# Patient Record
Sex: FEMALE | Race: WHITE | NOT HISPANIC OR LATINO | Employment: PART TIME | URBAN - METROPOLITAN AREA
[De-identification: names, ages, dates, MRNs, and addresses within clinical notes are randomized per-mention and may not be internally consistent; named-entity substitution may affect disease eponyms.]

---

## 2017-07-06 ENCOUNTER — HOSPITAL ENCOUNTER (OUTPATIENT)
Facility: CLINIC | Age: 32
End: 2017-07-06
Attending: ORTHOPAEDIC SURGERY | Admitting: ORTHOPAEDIC SURGERY

## 2017-08-13 ENCOUNTER — HOSPITAL ENCOUNTER (INPATIENT)
Facility: HOSPITAL | Age: 32
LOS: 2 days | Discharge: HOME | DRG: 189 | End: 2017-08-16
Attending: EMERGENCY MEDICINE | Admitting: INTERNAL MEDICINE
Payer: MEDICARE

## 2017-08-13 DIAGNOSIS — J20.9 ACUTE BRONCHITIS, UNSPECIFIED ORGANISM: ICD-10-CM

## 2017-08-13 DIAGNOSIS — J45.901 ASTHMA EXACERBATION: ICD-10-CM

## 2017-08-13 DIAGNOSIS — G47.30 SLEEP APNEA, UNSPECIFIED TYPE: ICD-10-CM

## 2017-08-13 DIAGNOSIS — J45.20 MILD INTERMITTENT ASTHMA IN ADULT WITHOUT COMPLICATION: ICD-10-CM

## 2017-08-13 DIAGNOSIS — R10.84 ABDOMINAL PAIN, GENERALIZED: ICD-10-CM

## 2017-08-13 DIAGNOSIS — J84.9 IDIOPATHIC INTERSTITIAL PULMONARY DISEASE (H): Primary | ICD-10-CM

## 2017-08-13 LAB
ALBUMIN SERPL-MCNC: 3.5 G/DL (ref 3.4–5)
ALBUMIN UR-MCNC: 10 MG/DL
ALP SERPL-CCNC: 60 U/L (ref 40–150)
ALT SERPL W P-5'-P-CCNC: 23 U/L (ref 0–50)
AMYLASE SERPL-CCNC: 23 U/L (ref 30–110)
ANION GAP SERPL CALCULATED.3IONS-SCNC: 8 MMOL/L (ref 3–14)
APPEARANCE UR: CLEAR
AST SERPL W P-5'-P-CCNC: 15 U/L (ref 0–45)
BACTERIA #/AREA URNS HPF: ABNORMAL /HPF
BASOPHILS # BLD AUTO: 0.1 10E9/L (ref 0–0.2)
BASOPHILS NFR BLD AUTO: 0.4 %
BILIRUB SERPL-MCNC: 0.3 MG/DL (ref 0.2–1.3)
BILIRUB UR QL STRIP: NEGATIVE
BUN SERPL-MCNC: 10 MG/DL (ref 7–30)
CALCIUM SERPL-MCNC: 8.8 MG/DL (ref 8.5–10.1)
CHLORIDE SERPL-SCNC: 107 MMOL/L (ref 94–109)
CO2 SERPL-SCNC: 24 MMOL/L (ref 20–32)
COLOR UR AUTO: YELLOW
CREAT SERPL-MCNC: 0.68 MG/DL (ref 0.52–1.04)
D DIMER PPP DDU-MCNC: 236 NG/ML D-DU (ref 0–300)
DIFFERENTIAL METHOD BLD: ABNORMAL
EOSINOPHIL # BLD AUTO: 0.4 10E9/L (ref 0–0.7)
EOSINOPHIL NFR BLD AUTO: 2.4 %
ERYTHROCYTE [DISTWIDTH] IN BLOOD BY AUTOMATED COUNT: 13.2 % (ref 10–15)
GFR SERPL CREATININE-BSD FRML MDRD: ABNORMAL ML/MIN/1.7M2
GLUCOSE SERPL-MCNC: 128 MG/DL (ref 70–99)
GLUCOSE UR STRIP-MCNC: NEGATIVE MG/DL
HCG UR QL: NEGATIVE
HCT VFR BLD AUTO: 39 % (ref 35–47)
HGB BLD-MCNC: 13.2 G/DL (ref 11.7–15.7)
HGB UR QL STRIP: NEGATIVE
IMM GRANULOCYTES # BLD: 0.1 10E9/L (ref 0–0.4)
IMM GRANULOCYTES NFR BLD: 0.4 %
KETONES UR STRIP-MCNC: NEGATIVE MG/DL
LEUKOCYTE ESTERASE UR QL STRIP: NEGATIVE
LIPASE SERPL-CCNC: 83 U/L (ref 73–393)
LYMPHOCYTES # BLD AUTO: 2.7 10E9/L (ref 0.8–5.3)
LYMPHOCYTES NFR BLD AUTO: 15.2 %
MCH RBC QN AUTO: 29.4 PG (ref 26.5–33)
MCHC RBC AUTO-ENTMCNC: 33.8 G/DL (ref 31.5–36.5)
MCV RBC AUTO: 87 FL (ref 78–100)
MONOCYTES # BLD AUTO: 1.4 10E9/L (ref 0–1.3)
MONOCYTES NFR BLD AUTO: 7.9 %
MUCOUS THREADS #/AREA URNS LPF: PRESENT /LPF
NEUTROPHILS # BLD AUTO: 13 10E9/L (ref 1.6–8.3)
NEUTROPHILS NFR BLD AUTO: 73.7 %
NITRATE UR QL: NEGATIVE
NRBC # BLD AUTO: 0 10*3/UL
NRBC BLD AUTO-RTO: 0 /100
PH UR STRIP: 5.5 PH (ref 4.7–8)
PLATELET # BLD AUTO: 335 10E9/L (ref 150–450)
POTASSIUM SERPL-SCNC: 3.6 MMOL/L (ref 3.4–5.3)
PROT SERPL-MCNC: 8.2 G/DL (ref 6.8–8.8)
RBC # BLD AUTO: 4.49 10E12/L (ref 3.8–5.2)
RBC #/AREA URNS AUTO: 2 /HPF (ref 0–2)
SODIUM SERPL-SCNC: 139 MMOL/L (ref 133–144)
SP GR UR STRIP: 1.02 (ref 1–1.03)
SQUAMOUS #/AREA URNS AUTO: 3 /HPF (ref 0–1)
URN SPEC COLLECT METH UR: ABNORMAL
UROBILINOGEN UR STRIP-MCNC: NORMAL MG/DL (ref 0–2)
WBC # BLD AUTO: 17.6 10E9/L (ref 4–11)
WBC #/AREA URNS AUTO: 1 /HPF (ref 0–2)

## 2017-08-13 PROCEDURE — 85379 FIBRIN DEGRADATION QUANT: CPT | Performed by: EMERGENCY MEDICINE

## 2017-08-13 PROCEDURE — 81001 URINALYSIS AUTO W/SCOPE: CPT | Performed by: EMERGENCY MEDICINE

## 2017-08-13 PROCEDURE — 71020 ZZHC CHEST TWO VIEWS, FRONT/LAT: CPT | Mod: TC

## 2017-08-13 PROCEDURE — 25000128 H RX IP 250 OP 636: Performed by: EMERGENCY MEDICINE

## 2017-08-13 PROCEDURE — 25000128 H RX IP 250 OP 636

## 2017-08-13 PROCEDURE — 80053 COMPREHEN METABOLIC PANEL: CPT | Performed by: EMERGENCY MEDICINE

## 2017-08-13 PROCEDURE — 99285 EMERGENCY DEPT VISIT HI MDM: CPT | Mod: 25

## 2017-08-13 PROCEDURE — 99285 EMERGENCY DEPT VISIT HI MDM: CPT | Performed by: EMERGENCY MEDICINE

## 2017-08-13 PROCEDURE — 81025 URINE PREGNANCY TEST: CPT | Performed by: EMERGENCY MEDICINE

## 2017-08-13 PROCEDURE — 83690 ASSAY OF LIPASE: CPT | Performed by: EMERGENCY MEDICINE

## 2017-08-13 PROCEDURE — 82150 ASSAY OF AMYLASE: CPT | Performed by: EMERGENCY MEDICINE

## 2017-08-13 PROCEDURE — 96375 TX/PRO/DX INJ NEW DRUG ADDON: CPT

## 2017-08-13 PROCEDURE — 96374 THER/PROPH/DIAG INJ IV PUSH: CPT

## 2017-08-13 PROCEDURE — 85025 COMPLETE CBC W/AUTO DIFF WBC: CPT | Performed by: EMERGENCY MEDICINE

## 2017-08-13 RX ORDER — KETOROLAC TROMETHAMINE 30 MG/ML
30 INJECTION, SOLUTION INTRAMUSCULAR; INTRAVENOUS ONCE
Status: COMPLETED | OUTPATIENT
Start: 2017-08-13 | End: 2017-08-13

## 2017-08-13 RX ORDER — MORPHINE SULFATE 4 MG/ML
INJECTION, SOLUTION INTRAMUSCULAR; INTRAVENOUS
Status: COMPLETED
Start: 2017-08-13 | End: 2017-08-13

## 2017-08-13 RX ORDER — MORPHINE SULFATE 4 MG/ML
4 INJECTION, SOLUTION INTRAMUSCULAR; INTRAVENOUS ONCE
Status: COMPLETED | OUTPATIENT
Start: 2017-08-13 | End: 2017-08-13

## 2017-08-13 RX ADMIN — MORPHINE SULFATE 4 MG: 4 INJECTION, SOLUTION INTRAMUSCULAR; INTRAVENOUS at 23:41

## 2017-08-13 RX ADMIN — KETOROLAC TROMETHAMINE 30 MG: 30 INJECTION, SOLUTION INTRAMUSCULAR; INTRAVENOUS at 23:40

## 2017-08-13 ASSESSMENT — ENCOUNTER SYMPTOMS
CARDIOVASCULAR NEGATIVE: 1
ENDOCRINE NEGATIVE: 1
RESPIRATORY NEGATIVE: 1
NEUROLOGICAL NEGATIVE: 1
EYES NEGATIVE: 1
SHORTNESS OF BREATH: 0
FEVER: 0
HEMATOLOGIC/LYMPHATIC NEGATIVE: 1
MUSCULOSKELETAL NEGATIVE: 1
ALLERGIC/IMMUNOLOGIC NEGATIVE: 1
CHILLS: 0
CONSTITUTIONAL NEGATIVE: 1
PSYCHIATRIC NEGATIVE: 1

## 2017-08-13 NOTE — IP AVS SNAPSHOT
MRN:[id number]                      After Visit Summary   8/13/2017    Cassandra Camejo    MRN: [id number]           Thank you!     Thank you for choosing [facility] for your care. Our goal is always to provide you with excellent care. Hearing back from our patients is one way we can continue to improve our services. Please take a few minutes to complete the written survey that you may receive in the mail after you visit with us. Thank you!        Patient Information     Date Of Birth          1985        Designated Caregiver       Most Recent Value    Caregiver    Will someone help with your care after discharge? yes    Name of designated caregiver Khang :     Phone number of caregiver see facesheet     Caregiver address [place], [place]       About your hospital stay     You were admitted on:  August 14, 2017 You last received care in the:  14 Intensive Care Unit    You were discharged on:  August 16, 2017        Reason for your hospital stay       Cassandra Camejo is a 32 year old woman who was admitted on 8/13/2017. She has a history of asthma and tobacco abuse, noting tright flank pain along with recent symptoms typical for her of asthma exacerbation. Prominent hypoxemia, resolved over several days.   Clinical impression was of a viral pneumonitis or possible noninfectious inflammatory lung disorder.  Other evaluation raised concerns for sleep disordered breathing.  Pulmonary function testing arranged as outpatient as well as sleep study. relatively prolonged taper of systemic steroids prescribed at discharge given possible inflammatory lung disorder.                  Who to Call     For medical emergencies, please call 911.  For non-urgent questions about your medical care, please call your primary care provider or clinic, [phone]          Attending Provider     Provider Specialty    Juice Flores MD Emergency Medicine    Marky Vogel,  Internal Medicine    Pancho  Tera GUTIERREZ MD Internal Medicine       Primary Care Provider Office Phone # Fax #    Varinder Burk [phone]      After Care Instructions     Activity       Your activity upon discharge: activity as tolerated            Diet       Follow this diet upon discharge: Orders Placed This Encounter      Regular Diet Adult                  Follow-up Appointments     Follow-up and recommended labs and tests        Follow up with primary care provider, Varinder Burk, within 7-14 days for hospital follow- up.   Pulmonary function testing and sleep study arranged at the time of discharge  Smoking cessation strongly recommended  PA/lat CXR in 4-6 weeks                  Your next 10 appointments already scheduled     Oct 19, 2017   Procedure with Chito Avalos MD   [facility] (--)    [address]  [facility] [address] [phone]              Additional Services     SLEEP EVALUATION & MANAGEMENT REFERRAL - ADULT       Please be aware that coverage of these services is subject to the terms and limitations of your health insurance plan.  Call member services at your health plan with any benefit or coverage questions.      Please bring the following to your appointment:    >>   List of current medications   >>   This referral request   >>   Any documents/labs given to you for this referral    Other:  [facility]                  Future tests that were ordered for you     General PFT Lab (Please always keep checked)           Pulmonary Function Test       ** Bronchodilators/neb treatments should be held 4-6 hours prior to receiving a bronchodilator study.    [facility] will distribute Patient Information Packet;     [facility] ([facility]) will contact patient by telephone    Please call the following departments if there are questions or need assistance:  [facility]: [phone]  [facility]:  [phone]  [facility]: [phone]  [facility] Reg[facility]: [phone]  [facility]: [phone]  [facility]: [phone]                        Further instructions from your care team       Sleep lab has been notified that you need an appointment set up for sleep study and PFT. They will call you to schedule an appointment that fits your schedule.    Also follow up with Dr. Burk as scheduled on September 21st at 9:00 AM, unless need arises to see him earlier.     Pending Results     Date and Time Order Name Status Description    8/14/2017 1001 Blood culture Preliminary     8/14/2017 1001 Blood culture Preliminary     8/14/2017 0702 Sputum Culture Aerobic Bacterial Preliminary             Statement of Approval     Ordered          08/16/17 1005  I have reviewed and agree with all the recommendations and orders detailed in this document.  EFFECTIVE NOW     Approved and electronically signed by:  Tera Deleon MD             Admission Information     Date & Time Provider Department Dept. Phone    8/13/2017 Tera Deleon MD 14 Intensive Care Unit [phone]      Your Vitals Were     Blood Pressure Pulse Temperature Respirations Height Weight    141/94 84 98.2  F (36.8  C) (Tympanic) 18 1.651 m (5' 5\") 117.9 kg (259 lb 14.8 oz)    Pulse Oximetry BMI (Body Mass Index)                95% 43.25 kg/m2          MyChart Information     [employer] lets you send messages to your doctor, view your test results, renew your prescriptions, schedule appointments and more. To sign up, go to [email or web address] . Click on \"Log in\" on the left side of the screen, which will take you to the Welcome page. Then click on \"Sign up Now\" on the right side of the page.     You will be asked to enter the access code listed below, as well as some personal information. Please follow the directions to create your username and password.     Your access code is: " [email or web address]  Expires: 2017 10:07 AM     Your access code will  in 90 days. If you need help or a new code, please call your [facility] clinic or [phone].        Care EveryWhere ID     This is your Care EveryWhere ID. This could be used by other organizations to access your [facility] medical records  [id number]        Equal Access to Services     [facility]: Hadii aad ku hadasho Soomaali, waaxda luqadaha, qaybta kaalmada adeegyada, waxay idiin hayaan adeeg patricia laRandykamla . So [facility] [phone].    ATENCIÓN: Si habla español, tiene a begum disposición servicios gratuitos de asistencia lingüística. Dre al [phone].    We comply with applicable federal civil rights laws and [place] laws. We do not discriminate on the basis of race, color, national origin, age, disability sex, sexual orientation or gender identity.               Review of your medicines      START taking        Dose / Directions    azithromycin 250 MG tablet   Commonly known as:  ZITHROMAX Z-VANDA        Two tablets on the first day, then one tablet daily for the next 4 days   Quantity:  6 tablet   Refills:  0       predniSONE 10 MG tablet   Commonly known as:  DELTASONE   Used for:  Idiopathic interstitial pulmonary disease (H)        4 tabs PO daily for 7 days, then 3 tabs PO daily for 7 days, then 2 tabs PO daily for 7 days then 1 tab PO daily for 7 days, then stop   Quantity:  70 tablet   Refills:  0         CONTINUE these medicines which have NOT CHANGED        Dose / Directions    albuterol 108 (90 BASE) MCG/ACT Inhaler   Commonly known as:  PROAIR HFA/PROVENTIL HFA/VENTOLIN HFA        Dose:  2 puff   Inhale 2 puffs into the lungs every 6 hours   Refills:  0       benzonatate 100 MG capsule   Commonly known as:  TESSALON   Used for:  Asthma with acute exacerbation, unspecified asthma severity        Dose:  100 mg   Take 1 capsule (100 mg) by mouth 3 times daily as needed for cough   Quantity:  30 capsule   Refills:  0        guaiFENesin-dextromethorphan 100-10 MG/5ML syrup   Commonly known as:  ROBITUSSIN DM   Used for:  Asthma with acute exacerbation, unspecified asthma severity        Dose:  10 mL   Take 10 mLs by mouth every 4 hours as needed for cough   Quantity:  560 mL   Refills:  0       HYDROcodone-acetaminophen 5-325 MG per tablet   Commonly known as:  NORCO        Dose:  1 tablet   Take 1 tablet by mouth 4 times daily as needed for moderate to severe pain   Refills:  0       IBUPROFEN PO        Dose:  400 mg   Take 400 mg by mouth every 6 hours as needed for moderate pain   Refills:  0       ipratropium - albuterol 0.5 mg/2.5 mg/3 mL 0.5-2.5 (3) MG/3ML neb solution   Commonly known as:  DUONEB   Used for:  Asthma with acute exacerbation, unspecified asthma severity        Dose:  3 mL   Take 1 vial (3 mLs) by nebulization every 4 hours as needed for wheezing   Quantity:  360 mL   Refills:  0       nicotine polacrilex 2 MG gum   Commonly known as:  RA NICOTINE   Used for:  Tobacco dependence syndrome        Dose:  2 mg   Place 1 each (2 mg) inside cheek as needed for smoking cessation   Quantity:  30 tablet   Refills:  0       order for DME   Used for:  Asthma with acute exacerbation, unspecified asthma severity        Equipment being ordered: Other: nebulization machine Treatment Diagnosis: acute asthma exacerbation   Quantity:  1 Device   Refills:  0            Where to get your medicines      These medications were sent to [facility] [address] [address]     Phone:  [phone]     predniSONE 10 MG tablet         Some of these will need a paper prescription and others can be bought over the counter. Ask your nurse if you have questions.     Bring a paper prescription for each of these medications     azithromycin 250 MG tablet                Protect others around you: Learn how to safely use, store and throw away your medicines at www.disposemymeds.org.              Medication List: This is a list of all your medications and when to take them. Check marks below indicate your daily home schedule. Keep this list as a reference.      Medications           Morning Afternoon Evening Bedtime As Needed    albuterol 108 (90 BASE) MCG/ACT Inhaler   Commonly known as:  PROAIR HFA/PROVENTIL HFA/VENTOLIN HFA   Inhale 2 puffs into the lungs every 6 hours   Last time this was given:  2 puffs on 8/15/2017  7:25 PM                                azithromycin 250 MG tablet   Commonly known as:  ZITHROMAX Z-VANDA   Two tablets on the first day, then one tablet daily for the next 4 days   Last time this was given:  500 mg on 8/14/2017  2:50 AM                                benzonatate 100 MG capsule   Commonly known as:  TESSALON   Take 1 capsule (100 mg) by mouth 3 times daily as needed for cough   Last time this was given:  100 mg on 8/16/2017  4:20 AM                                guaiFENesin-dextromethorphan 100-10 MG/5ML syrup   Commonly known as:  ROBITUSSIN DM   Take 10 mLs by mouth every 4 hours as needed for cough   Last time this was given:  5 mLs on 8/16/2017  8:50 AM                                HYDROcodone-acetaminophen 5-325 MG per tablet   Commonly known as:  NORCO   Take 1 tablet by mouth 4 times daily as needed for moderate to severe pain   Last time this was given:  1 tablet on 8/16/2017  8:50 AM                                IBUPROFEN PO   Take 400 mg by mouth every 6 hours as needed for moderate pain   Last time this was given:  400 mg on 8/16/2017  4:20 AM                                ipratropium - albuterol 0.5 mg/2.5 mg/3 mL 0.5-2.5 (3) MG/3ML neb solution   Commonly known as:  DUONEB   Take 1 vial (3 mLs) by nebulization every 4 hours as needed for wheezing   Last time this was given:  3 mLs on 8/14/2017  4:45 PM                                nicotine polacrilex 2 MG gum   Commonly known as:  RA NICOTINE   Place 1 each (2 mg) inside cheek as needed for smoking  cessation                                order for DME   Equipment being ordered: Other: nebulization machine Treatment Diagnosis: acute asthma exacerbation                                predniSONE 10 MG tablet   Commonly known as:  DELTASONE   4 tabs PO daily for 7 days, then 3 tabs PO daily for 7 days, then 2 tabs PO daily for 7 days then 1 tab PO daily for 7 days, then stop   Last time this was given:  40 mg on 8/16/2017  8:50 AM                                          More Information        Heparin injection  What is this medicine?  HEPARIN (HEP a rin) is an anticoagulant. It is used to treat or prevent clots in the veins, arteries, lungs, or heart. It stops clots from forming or getting bigger. This medicine prevents clotting during open-heart surgery, dialysis, or in patients who are confined to bed.  How should I use this medicine?  This medicine is given by injection or infusion into a vein. It can also be given by injection of small amounts under the skin. It is usually given by a health care professional in a hospital or clinic setting.  If you get this medicine at home, you will be taught how to prepare and give this medicine. Use exactly as directed. Take your medicine at regular intervals. Do not take it more often than directed. Do not stop taking except on your doctor's advice. Stopping this medicine may increase your risk of a blot clot. Be sure to refill your prescription before you run out of medicine.  It is important that you put your used needles and syringes in a special sharps container. Do not put them in a trash can. If you do not have a sharps container, call your pharmacist or healthcare provider to get one.  Talk to your pediatrician regarding the use of this medicine in children. While this medicine may be prescribed for children for selected conditions, precautions do apply.  What side effects may I notice from receiving this medicine?  Side effects that you should report to your  doctor or health care professional as soon as possible:    allergic reactions like skin rash, itching or hives, swelling of the face, lips, or tongue    back pain    burning or itching on the bottoms of the feet    cold, blue, or painful hands and feet    feeling faint or lightheaded, falls    fever, chills    nausea, vomiting    signs and symptoms of bleeding such as bloody or black, tarry stools; red or dark-brown urine; spitting up blood or brown material that looks like coffee grounds; red spots on the skin; unusual bruising or bleeding from the eye, gums, or nose    stomach pain    unusually low blood pressure  Side effects that usually do not require medical attention (report to your doctor or health care professional if they continue or are bothersome):    pain, redness, or irritation at site where injected  What may interact with this medicine?  Do not take this medicine with any of the following medications:    aspirin and aspirin-like drugs    mifepristone    medicines that treat or prevent blood clots like warfarin, enoxaparin, and dalteparin    palifermin    protamine  This medicine may also interact with the following medications:    dextran    digoxin    hydroxychloroquine    medicines for treating colds or allergies    nicotine    NSAIDs, medicines for pain and inflammation, like ibuprofen or naproxen    phenylbutazone    tetracycline antibiotics  What if I miss a dose?  If you miss a dose, take it as soon as you can. If it is almost time for your next dose, take only that dose. Do not take double or extra doses.  Where should I keep my medicine?  Keep out of the reach of children.  Store unopened vials at room temperature between 15 and 30 degrees C (59 and 86 degrees F). Do not freeze. Do not use if solution is discolored or particulate matter is present. Throw away any unused medicine after the expiration date.  What should I tell my health care provider before I take this medicine?  They need to  know if you have any of these conditions:    bleeding disorders, such as hemophilia or low blood platelets    bowel disease or diverticulitis    endocarditis    high blood pressure    liver disease    recent surgery or delivery of a baby    stomach ulcers    an unusual or allergic reaction to heparin, benzyl alcohol, sulfites, other medicines, foods, dyes, or preservatives    pregnant or trying to get pregnant    breast-feeding  What should I watch for while using this medicine?  While you are taking this medicine, carry an identification card with your name, the name and dose of medicine(s) being used, and the name and phone number of your doctor or health care professional or person to contact in an emergency.  Notify your doctor or health care professional and seek emergency treatment if you develop breathing problems; changes in vision; chest pain; severe, sudden headache; pain, swelling, warmth in the leg; trouble speaking; sudden numbness or weakness of the face, arm, or leg. These can be signs that your condition has gotten worse.  Notify your doctor or health care professional at once if you have cold, blue hands or feet.  If you are going to have surgery or dental work, tell your doctor or health care professional that you have received this medicine. Be careful brushing and flossing your teeth or using a toothpick while receiving this medicine because you may bleed more easily.  Avoid sports and activities that might cause injury while you are using this medicine. Severe falls or injuries can cause unseen bleeding. Be careful when using sharp tools or knives. Consider using an electric razor.  NOTE:This sheet is a summary. It may not cover all possible information. If you have questions about this medicine, talk to your doctor, pharmacist, or health care provider. Copyright  2017 [employer]                Pneumonia (Adult)  Pneumonia is an infection deep within the lungs. It is in the small air sacs  (alveoli). Pneumonia may be caused by a virus or bacteria. Pneumonia caused by bacteria is usually treated with an antibiotic. Severe cases may need to be treated in the hospital. Milder cases can be treated at home. Symptoms usually start to get better during the first 2 days of treatment.    Home care  Follow these guidelines when caring for yourself at home:    Rest at home for the first 2 to 3 days, or until you feel stronger. Don t let yourself get overly tired when you go back to your activities.    Stay away from cigarette smoke - yours or other people s.    You may use acetaminophen or ibuprofen to control fever or pain, unless another medicine was prescribed. If you have chronic liver or kidney disease, talk with your healthcare provider before using these medicines. Also talk with your provider if you ve had a stomach ulcer or gastrointestinal bleeding. Don t give aspirin to anyone younger than 18 years of age who is ill with a fever. It may cause severe liver damage.    Your appetite may be poor, so a light diet is fine.    Drink 6 to 8 glasses of fluids every day to make sure you are getting enough fluids. Beverages can include water, sport drinks, sodas without caffeine, juices, tea, or soup. Fluids will help loosen secretions in the lung. This will make it easier for you to cough up the phlegm (sputum). If you also have heart or kidney disease, check with your healthcare provider before you drink extra fluids.    Take antibiotic medicine prescribed until it is all gone, even if you are feeling better after a few days.  Follow-up care  Follow up with your healthcare provider in the next 2 to 3 days, or as advised. This is to be sure the medicine is helping you get better.  If you are 65 or older, you should get a pneumococcal vaccine and a yearly flu (influenza) shot. You should also get these vaccines if you have chronic lung disease like asthma, emphysema, or COPD. Recently, a second type of pneumonia  vaccine has become available for everyone over 65 years old. This is in addition to the previous vaccine. Ask your provider about this.  When to seek medical advice  Call your healthcare provider right away if any of these occur:    You don t get better within the first 48 hours of treatment    Shortness of breath gets worse    Rapid breathing (more than 25 breaths per minute)    Coughing up blood    Chest pain gets worse with breathing    Fever of 100.4 F (38 C) or higher that doesn t get better with fever medicine    Weakness, dizziness, or fainting that gets worse    Thirst or dry mouth that gets worse    Sinus pain, headache, or a stiff neck    Chest pain not caused by coughing  Date Last Reviewed: 1/1/2017 2000-2017 [employer]. [address] [place] [address], [place] [address]. All rights reserved. This information is not intended as a substitute for professional medical care. Always follow your healthcare professional's instructions.                Treating Pneumonia  Pneumonia is an infection of one or both of the lungs. Pneumonia:    Is usually caused by either a virus or a bacteria    Can be very serious, especially in infants, young children, and older adults. It s also serious for those with other long-term health problems or weakened immune systems.    Is sometimes treated at home and sometimes in the hospital    Antibiotic medicines  Antibiotics may be prescribed for pneumonia caused by bacteria. They may be pills (oral medicines), or shots (injections). Or they may be given by IV (intravenously) into a vein. If you are taking oral medicines at home:    Fill your prescription and start taking your medicine as soon as you can.    You will likely start to feel better in a day or 2, but don t stop taking the antibiotic.    Use a pill organizer to help you remember to take your medicine.    Let your healthcare provider know if you have side effects.    Take your medicine exactly as directed on the  label. Talk to your provider or pharmacist if you have any questions.  Antiviral medicines  Antiviral medicine may be prescribed for pneumonia caused by a virus. For example, antiviral medicine may be prescribed for pneumonia caused by the flu virus. Antibiotics do not work against viruses. If you are taking antiviral medicine at home:    Fill your prescription and start taking your medicine as soon as you can.    Talk with your provider or pharmacist about possible side effects.    Take the medicine exactly as instructed.  To relieve symptoms  There are many medicines that can help relieve symptoms of pneumonia. Some are prescription and some are over-the-counter.  Your healthcare provider may recommend:    Acetaminophen or ibuprofen to lower your fever and to lessen headache or other pain    Cough medicine to loosen mucus or to reduce coughing  Make sure you check with your healthcare provider or pharmacist before taking any over-the-counter medicines.  Special treatments  If you are hospitalized for pneumonia, you may have other therapies, including:    Inhaled medicines to help with breathing or chest congestion    Supplemental oxygen to increase low oxygen levels  Drink fluids and eat healthy  You should eat healthy to help your body fight the infection. Drinking a lot of fluids helps to replace fluids lost from fever and to loosen mucus in your chest.    Diet. Make healthy food choices, including fruits and vegetables, lean meats and other proteins, 100% whole grain and low- or no-fat dairy products.    Fluids. Drink at least 6 to 8 tall glasses a day. Water and 100% fruit or vegetable juice are best.  Get plenty of rest and sleep  You may be more tired than usual for a while. It is important to get enough sleep at night. It s also important to rest during the day. Talk with your healthcare provider if coughing or other symptoms are interfering with your sleep.  Preventing the spread of germs  The best thing  you can do to prevent spreading germs is to wash your hands often. You should:    Rub your hand with soap and water for 20 to 30 seconds.    Clean in between your fingers, the backs of your hands, and around your nails.    Dry your hands on a separate towel or use paper towels.  You should also:    Keep alcohol-based hand  nearby.    Make sure you also clean surfaces that you touch. Use a product that kills all types of germs.    Stay away from others until you are feeling better.  When to call your healthcare provider  Call your healthcare provider if you have any of the following:    Symptoms get worse    Fever continues    Shortness of breath gets worse    Increased mucus or mucus that is darker in color    Coughing gets worse    Lips or fingers are bluish in color    Side effects from your medicine   Date Last Reviewed: 12/1/2016 2000-2017 [employer]. [address] [place] [address]. All rights reserved. This information is not intended as a substitute for professional medical care. Always follow your healthcare professional's instructions.                What is Pneumonia?  Pneumonia is a serious lung infection. Many cases of pneumonia are caused by bacteria or viruses. Fungi may also cause pneumonia, but this is less common.  You may also get pneumonia after another illness, such as a cold, flu, or bronchitis. Those most at risk include older adults, smokers, and people with long-term (chronic) health problems or weak immune systems.  Healthy lungs    Air travels in and out of the lungs through tubes called airways.    The tubes branch into smaller passages called bronchioles. These end in tiny sacs called alveoli.    Blood vessels surrounding the alveoli take oxygen into the bloodstream. At the same time, the alveoli remove carbon dioxide (a waste gas) from the blood. The carbon dioxide is then exhaled.  When you have pneumonia      Pneumonia causes the bronchioles and the  alveoli to fill with excess mucus and become inflamed.    Your body s response may be to cough. This can help clear out the fluid.    The fluid (or mucus) you cough up may appear green or dark yellow.    The excess mucus may make you feel short of breath.    The inflammation and infection may give you a fever.  What are the symptoms?  Symptoms of pneumonia can come without warning. At first, you may think you have a cold or flu. But symptoms may get worse quickly, turning into pneumonia. Symptoms can be different for bacterial and viral pneumonia. Common symptoms may include the following:    Severe cough with green or yellow mucus that doesn't improve or that gets worse    Fever and chills    Nausea, vomiting or diarrhea    Shortness of breath with normal daily activities    Increased heart rate    Chest pain or discomfort when breathing in or coughing    Headache    Excessive sweating and clammy skin  Date Last Reviewed: 12/1/2016 2000-2017 [employer]. [address] [place] [address]. All rights reserved. This information is not intended as a substitute for professional medical care. Always follow your healthcare professional's instructions.                When You Have Pneumonia  You have been diagnosed with pneumonia. This is a serious lung infection. Most cases of pneumonia are caused by bacteria. Pneumonia most often occurs in older adults, young children, and people with chronic health problems.  Home care    Take your medicine exactly as directed. Don t skip doses. Continue taking your antibiotics as until they are all gone, even if you start to feel better. This will prevent the pneumonia from coming back.    Drink at least 8 glasses of water daily, unless directed otherwise. This helps to loosen and thin secretions so that you can cough them up.    Use a cool-mist humidifier in your bedroom. Be sure to clean the humidifier daily.    Don t use medicines to suppress your cough  unless your cough is dry, painful, or interferes with your sleep. Coughing up mucus is normal. You may use an expectorant if your healthcare provider says it s okay.    You can use warm compresses or a heating pad on the lowest setting to relieve chest discomfort. Use several times a day for 15-20 minutes at a time. To prevent injury to your skin, set the temperature to warm, not hot. Don t put the compress or pad directly on your skin. Make certain it has a cover or wrap it in a towel. This is to prevent skin burns.    Get plenty of rest until your fever, shortness of breath, and chest pain go away.    Plan to get a flu shot every year. The flu is a common cause of pneumonia. Getting a flu shot every year can help prevent both the flu and pneumonia.  Getting the pneumococcal vaccine  Talk with your healthcare provider about getting the pneumococcalvaccine. Pneumococcal pneumonia is caused by bacteria that spread from person to person. It can cause minor problems, such as ear infections. But it can also turn into life-threatening illnesses of the lungs (pneumonia), the covering of the brain and spinal cord (meningitis), and the blood (bacteremia).  Children under 2 years of age, adults over age 65, people with certain health conditions, and smokers are at the highest risk of pneumococcal disease. This vaccine can help prevent pneumococcal disease in both adults and children. Some people should not have the vaccine. Make sure to ask your healthcare provider if you should have the vaccine.   Follow-up care  Make a follow-up appointment as directed by our staff.  When to call your healthcare provider  Call your healthcare provider if you have any of the following:    Fever above 100.4 F (38 C), or as directed by your healthcare provider    Mucus from the lungs (sputum) that s yellow, green, bloody, or smells bad    A large amount of sputum    Vomiting    Symptoms that get worse  When to call 911  Call 911 right away if  you have any of the following:    Chest pain    Trouble breathing    Blue lips or fingernails   Date Last Reviewed: 11/1/2016 2000-2017 [email or web address]. [address] [place] [address], [place] [address]. All rights reserved. This information is not intended as a substitute for professional medical care. Always follow your healthcare professional's instructions.                Understanding Asthma    Asthma causes swelling and narrowing of the airways in your lungs. Medical experts are not exactly sure what causes asthma. It is believed to be caused by a mix of inherited and environmental factors.  Healthy lungs  Inside the lungs there are branching airways made of stretchy tissue. Each airway is wrapped with bands of muscle. The airways become more narrow as they go deeper into the lungs. The smallest airways end in clusters of tiny balloon-like air sacs (alveoli). These clusters are surrounded by blood vessels. When you breathe in (inhale), air enters the lungs. It travels down through the airways until it reaches the air sacs. When you breathe out (exhale), air travels up through the airways and out of the lungs. The airways produce mucus that traps particles you breathe in. Normally, the mucus is then swept out of the lungs by tiny hairs (cilia) that line the airways. The mucus is swallowed or coughed up.  What the lungs do  The air you inhale contains oxygen. When oxygen reaches the air sacs, it passes into the blood vessels surrounding the sacs. Your blood then delivers oxygen to all of your cells. As you exhale, carbon dioxide is removed in a similar way from the blood in the air sacs, and from the body.  When you have asthma  People with asthma have very sensitive airways. This means the airways react to certain things called triggers (such as pollen, dust, or smoke) and become swollen and narrowed. Inflammation makes the airways swollen and narrowed. This is a long-lasting (chronic) problem. The airways  may not always be narrowed enough to notice breathing problems.  Symptoms of chronic inflammation:     Coughing    Chest tightness    Shortness of breath    Wheezing (a whistling noise, especially when breathing out)    Low energy or feeling tired  In some people, over time chronic mild inflammation can lead to lasting (permanent) scarring of airways and loss of lung function.  Moderate flare-ups  When sensitive airways are irritated by a trigger, the muscles around the airways tighten. The lining of the airways swells. Thick, sticky mucus increases and partly clogs the airways. All of this makes you work harder to keep breathing.  Symptoms of moderate flare-ups:    Coughing, especially at night    Getting tired or out of breath easily    Wheezing    Chest tightness    Faster breathing when at rest  Severe flare-ups  Severe flare-ups are life-threatening. In a severe flare-up, the muscle tightening, swelling, and mucus production are even worse. It s very hard to breathe. Your body can't get enough oxygen and can't remove carbon dioxide. Waste gas is trapped in the alveoli, and gas exchange can t occur. The body is not getting enough oxygen. Without oxygen, body tissues, especially brain tissue, begin to get damaged. If this goes on for long, it can lead to severe brain damage or death.  Call 911 (or have someone call for you) if you have any of these symptoms and they are not relieved right away by taking your quick-relief medicine as prescribed:    Severe trouble breathing    Too short of breath to talk or walk    Lips or fingers turning blue    Feeling lightheaded or dizzy, as though you are about to pass out    Peak flow less than 50% of your personal best, if you use peak flow monitoring  Asthma is a long-term condition. So it s important to work with your healthcare provider to manage it. If you smoke, get help to quit. Know your triggers and figure out how to avoid them. It s also very important to take your  medicines as directed. That means taking them even when you feel good.  Date Last Reviewed: 12/1/2016 2000-2017 [employer]. [address] [place] [address], [place] [address]. All rights reserved. This information is not intended as a substitute for professional medical care. Always follow your healthcare professional's instructions.                Discharge Instructions for Asthma  You have been diagnosed with an asthma attack. With the help of your healthcare provider, you can keep your asthma under control and have less emergency department visits and stays in the hospital.    Managing asthma    Take your asthma medicines exactly as your provider tells you. Do this even if you feel that your athma is under control.    Learn how to monitor your asthma. Some people watch for early changes of symptoms getting worse. Some use a peak flow meter. Your healthcare provider may decide to give you an asthma action plan.    Be sure to always have a quick-relief inhaler with you. If you were given a prescription, make sure you go to a pharmacy to get it filled as soon as possible.  Controlling asthma triggers  Triggers are those things that make your asthma symptoms worse or cause asthma attacks. Many people with asthma have allergies that can be triggers. Your healthcare provider may have you get allergy testing to find out what you are allergic to. This can help you stay away from triggers.  Dust or dust mites are a common asthma trigger. To avoid a dust mites, do the following:    Use dust-proof covers on your mattress and pillows. Wash the sheets and blankets on your bed once a week in very hot water.    Don t sleep or lie on cloth-covered cushions or furniture.    Ask someone else to vacuum and dust your house.    If you do vacuum and dust yourself, wear a dust mask. You can buy them from the [employer] store.    Use a vacuum with a double-layered bag or HEPA (high-efficiency particulate air) filter.  Pets with  fur or feathers are triggers for some people. If you must have pets, take these precautions:    Keep pets out of your bedroom and off your bed. Keep the bedroom door closed.    Cover the air vents in your bedroom with heavy material to filter the air.    Don't use carpets or cloth-covered furniture in your home. If this is not possible, keep pets out of rooms with these items.    Have someone bathe your pets every week. And brush them often.  If you smoke, do your best to quit.    Enroll in a stop-smoking program to increase your chance of success.    Ask your healthcare provider about medicines or other methods to help you quit.    Ask family members to quit smoking as well.    Don t allow anyone to smoke in your home, in your car, or around you.  Other steps to take    Make sure you know what to do if exercise is a trigger for you. Many people use quick-relief inhalers before exercise or physical activity.    Get a flu shot every year and get pneumonia shots as advised by your healthcare provider.    Try to keep your windows closed during pollen seasons and when mold counts are high.    On cold or windy days, cover your nose and mouth with a scarf.    Try to stay away from people who are sick with colds or the flu. Wash your hands often or use a hand . If respiratory infections like colds or flu trigger your asthma, use your quick-relief medicines as soon as you begin to notice respiratory symptoms. They may include a runny or stuffy nose, sore throat, or a cough.  Follow-up care  Make a follow-up appointment as directed by our staff. Follow your asthma action plan if you were given one.  When to seek medical attention  Call 911 right away if you have:    Severe wheezing    Shortness of breath that is not relieved by your quick-relief medication    Trouble walking or talking because of shortness of breath    Blue lips or fingernails    If you monitor symptoms with a peak flow meter, readings less than 50%  of your personal best   Date Last Reviewed: 2/3/2017    7677-5088 [employer]. [address] [place] [address], [place] [address]. All rights reserved. This information is not intended as a substitute for professional medical care. Always follow your healthcare professional's instructions.

## 2017-08-13 NOTE — IP AVS SNAPSHOT
14 Intensive Care Unit    [address] [place] [id number]    Phone:  [phone]    Fax:  [phone]                                       After Visit Summary   8/13/2017    Cassandra Camejo    MRN: [id number]           After Visit Summary Signature Page     I have received my discharge instructions, and my questions have been answered. I have discussed any challenges I see with this plan with the nurse or doctor.    ..........................................................................................................................................  Patient/Patient Representative Signature      ..........................................................................................................................................  Patient Representative Print Name and Relationship to Patient    ..................................................               ................................................  Date                                            Time    ..........................................................................................................................................  Reviewed by Signature/Title    ...................................................              ..............................................  Date                                                            Time

## 2017-08-14 ENCOUNTER — APPOINTMENT (OUTPATIENT)
Dept: ULTRASOUND IMAGING | Facility: HOSPITAL | Age: 32
DRG: 189 | End: 2017-08-14
Payer: MEDICARE

## 2017-08-14 PROBLEM — J45.901 ASTHMA ATTACK: Status: ACTIVE | Noted: 2017-08-14

## 2017-08-14 PROBLEM — J18.9 PNEUMONIA: Status: ACTIVE | Noted: 2017-08-14

## 2017-08-14 PROBLEM — J45.901 ASTHMA EXACERBATION: Status: ACTIVE | Noted: 2017-08-14

## 2017-08-14 LAB
BASE DEFICIT BLDA-SCNC: 5.2 MMOL/L
BASE DEFICIT BLDV-SCNC: 5.5 MMOL/L
GLUCOSE BLDC GLUCOMTR-MCNC: 191 MG/DL (ref 70–99)
GLUCOSE BLDC GLUCOMTR-MCNC: 231 MG/DL (ref 70–99)
HCO3 BLD-SCNC: 18 MMOL/L (ref 21–28)
HCO3 BLDV-SCNC: 19 MMOL/L (ref 21–28)
LACTATE SERPL-SCNC: 5.1 MMOL/L (ref 0.4–2)
LACTATE SERPL-SCNC: 6.2 MMOL/L (ref 0.4–2)
O2/TOTAL GAS SETTING VFR VENT: ABNORMAL %
O2/TOTAL GAS SETTING VFR VENT: ABNORMAL %
OXYHGB MFR BLD: 93 % (ref 92–100)
OXYHGB MFR BLDV: 90 %
PCO2 BLD: 30 MM HG (ref 35–45)
PCO2 BLDV: 33 MM HG (ref 40–50)
PH BLD: 7.4 PH (ref 7.35–7.45)
PH BLDV: 7.37 PH (ref 7.32–7.43)
PO2 BLD: 61 MM HG (ref 80–105)
PO2 BLDV: 60 MM HG (ref 25–47)
PROCALCITONIN SERPL-MCNC: NORMAL NG/ML

## 2017-08-14 PROCEDURE — A9270 NON-COVERED ITEM OR SERVICE: HCPCS | Mod: GY | Performed by: EMERGENCY MEDICINE

## 2017-08-14 PROCEDURE — 36415 COLL VENOUS BLD VENIPUNCTURE: CPT | Performed by: INTERNAL MEDICINE

## 2017-08-14 PROCEDURE — 94640 AIRWAY INHALATION TREATMENT: CPT

## 2017-08-14 PROCEDURE — 93306 TTE W/DOPPLER COMPLETE: CPT | Mod: TC

## 2017-08-14 PROCEDURE — 82947 ASSAY GLUCOSE BLOOD QUANT: CPT | Performed by: INTERNAL MEDICINE

## 2017-08-14 PROCEDURE — 84145 PROCALCITONIN (PCT): CPT | Performed by: INTERNAL MEDICINE

## 2017-08-14 PROCEDURE — 20000003 ZZH R&B ICU

## 2017-08-14 PROCEDURE — 25000125 ZZHC RX 250: Performed by: EMERGENCY MEDICINE

## 2017-08-14 PROCEDURE — 96361 HYDRATE IV INFUSION ADD-ON: CPT

## 2017-08-14 PROCEDURE — 71010 ZZHC CHEST ONE VIEW: CPT | Mod: TC

## 2017-08-14 PROCEDURE — 25000125 ZZHC RX 250: Performed by: INTERNAL MEDICINE

## 2017-08-14 PROCEDURE — 93306 TTE W/DOPPLER COMPLETE: CPT | Mod: 26 | Performed by: INTERNAL MEDICINE

## 2017-08-14 PROCEDURE — 87040 BLOOD CULTURE FOR BACTERIA: CPT | Performed by: INTERNAL MEDICINE

## 2017-08-14 PROCEDURE — 74177 CT ABD & PELVIS W/CONTRAST: CPT | Mod: TC

## 2017-08-14 PROCEDURE — 40000275 ZZH STATISTIC RCP TIME EA 10 MIN

## 2017-08-14 PROCEDURE — 25000132 ZZH RX MED GY IP 250 OP 250 PS 637: Mod: GY | Performed by: EMERGENCY MEDICINE

## 2017-08-14 PROCEDURE — 96375 TX/PRO/DX INJ NEW DRUG ADDON: CPT

## 2017-08-14 PROCEDURE — 25000128 H RX IP 250 OP 636: Performed by: EMERGENCY MEDICINE

## 2017-08-14 PROCEDURE — 36600 WITHDRAWAL OF ARTERIAL BLOOD: CPT

## 2017-08-14 PROCEDURE — 99223 1ST HOSP IP/OBS HIGH 75: CPT | Mod: AI | Performed by: INTERNAL MEDICINE

## 2017-08-14 PROCEDURE — 83605 ASSAY OF LACTIC ACID: CPT | Performed by: INTERNAL MEDICINE

## 2017-08-14 PROCEDURE — 25000132 ZZH RX MED GY IP 250 OP 250 PS 637: Mod: GY | Performed by: INTERNAL MEDICINE

## 2017-08-14 PROCEDURE — 94640 AIRWAY INHALATION TREATMENT: CPT | Mod: 76

## 2017-08-14 PROCEDURE — 25000128 H RX IP 250 OP 636: Performed by: INTERNAL MEDICINE

## 2017-08-14 PROCEDURE — 25000128 H RX IP 250 OP 636

## 2017-08-14 PROCEDURE — 96376 TX/PRO/DX INJ SAME DRUG ADON: CPT

## 2017-08-14 PROCEDURE — A9270 NON-COVERED ITEM OR SERVICE: HCPCS | Mod: GY | Performed by: INTERNAL MEDICINE

## 2017-08-14 PROCEDURE — 82805 BLOOD GASES W/O2 SATURATION: CPT | Performed by: INTERNAL MEDICINE

## 2017-08-14 PROCEDURE — 00000146 ZZHCL STATISTIC GLUCOSE BY METER IP

## 2017-08-14 RX ORDER — METHYLPREDNISOLONE SODIUM SUCCINATE 125 MG/2ML
60 INJECTION, POWDER, LYOPHILIZED, FOR SOLUTION INTRAMUSCULAR; INTRAVENOUS EVERY 6 HOURS
Status: DISCONTINUED | OUTPATIENT
Start: 2017-08-14 | End: 2017-08-15

## 2017-08-14 RX ORDER — NALOXONE HYDROCHLORIDE 0.4 MG/ML
.1-.4 INJECTION, SOLUTION INTRAMUSCULAR; INTRAVENOUS; SUBCUTANEOUS
Status: DISCONTINUED | OUTPATIENT
Start: 2017-08-14 | End: 2017-08-16 | Stop reason: HOSPADM

## 2017-08-14 RX ORDER — METHYLPREDNISOLONE SODIUM SUCCINATE 125 MG/2ML
125 INJECTION, POWDER, LYOPHILIZED, FOR SOLUTION INTRAMUSCULAR; INTRAVENOUS ONCE
Status: COMPLETED | OUTPATIENT
Start: 2017-08-14 | End: 2017-08-14

## 2017-08-14 RX ORDER — BISACODYL 10 MG
10 SUPPOSITORY, RECTAL RECTAL DAILY PRN
Status: DISCONTINUED | OUTPATIENT
Start: 2017-08-14 | End: 2017-08-16 | Stop reason: HOSPADM

## 2017-08-14 RX ORDER — IPRATROPIUM BROMIDE AND ALBUTEROL SULFATE 2.5; .5 MG/3ML; MG/3ML
3 SOLUTION RESPIRATORY (INHALATION) EVERY 4 HOURS PRN
Status: DISCONTINUED | OUTPATIENT
Start: 2017-08-14 | End: 2017-08-16 | Stop reason: HOSPADM

## 2017-08-14 RX ORDER — ALBUTEROL SULFATE 90 UG/1
2 AEROSOL, METERED RESPIRATORY (INHALATION) EVERY 6 HOURS
Status: DISCONTINUED | OUTPATIENT
Start: 2017-08-14 | End: 2017-08-16 | Stop reason: HOSPADM

## 2017-08-14 RX ORDER — AZITHROMYCIN 250 MG/1
500 TABLET, FILM COATED ORAL ONCE
Status: COMPLETED | OUTPATIENT
Start: 2017-08-14 | End: 2017-08-14

## 2017-08-14 RX ORDER — BENZONATATE 100 MG/1
100 CAPSULE ORAL 3 TIMES DAILY PRN
Status: DISCONTINUED | OUTPATIENT
Start: 2017-08-14 | End: 2017-08-16 | Stop reason: HOSPADM

## 2017-08-14 RX ORDER — IPRATROPIUM BROMIDE AND ALBUTEROL SULFATE 2.5; .5 MG/3ML; MG/3ML
3 SOLUTION RESPIRATORY (INHALATION) ONCE
Status: COMPLETED | OUTPATIENT
Start: 2017-08-14 | End: 2017-08-14

## 2017-08-14 RX ORDER — IPRATROPIUM BROMIDE AND ALBUTEROL SULFATE 2.5; .5 MG/3ML; MG/3ML
3 SOLUTION RESPIRATORY (INHALATION)
Status: DISCONTINUED | OUTPATIENT
Start: 2017-08-14 | End: 2017-08-14

## 2017-08-14 RX ORDER — ONDANSETRON 4 MG/1
4 TABLET, ORALLY DISINTEGRATING ORAL EVERY 6 HOURS PRN
Status: DISCONTINUED | OUTPATIENT
Start: 2017-08-14 | End: 2017-08-16 | Stop reason: HOSPADM

## 2017-08-14 RX ORDER — HEPARIN SODIUM 5000 [USP'U]/.5ML
5000 INJECTION, SOLUTION INTRAVENOUS; SUBCUTANEOUS EVERY 12 HOURS
Status: DISCONTINUED | OUTPATIENT
Start: 2017-08-14 | End: 2017-08-16 | Stop reason: HOSPADM

## 2017-08-14 RX ORDER — IBUPROFEN 200 MG
400 TABLET ORAL EVERY 6 HOURS PRN
Status: DISCONTINUED | OUTPATIENT
Start: 2017-08-14 | End: 2017-08-16 | Stop reason: HOSPADM

## 2017-08-14 RX ORDER — MORPHINE SULFATE 4 MG/ML
4 INJECTION, SOLUTION INTRAMUSCULAR; INTRAVENOUS ONCE
Status: COMPLETED | OUTPATIENT
Start: 2017-08-14 | End: 2017-08-14

## 2017-08-14 RX ORDER — AZITHROMYCIN 250 MG/1
TABLET, FILM COATED ORAL
Qty: 6 TABLET | Refills: 0 | Status: SHIPPED | OUTPATIENT
Start: 2017-08-14 | End: 2017-08-19

## 2017-08-14 RX ORDER — SODIUM CHLORIDE 9 MG/ML
1000 INJECTION, SOLUTION INTRAVENOUS CONTINUOUS
Status: DISCONTINUED | OUTPATIENT
Start: 2017-08-14 | End: 2017-08-14

## 2017-08-14 RX ORDER — IOPAMIDOL 612 MG/ML
100 INJECTION, SOLUTION INTRAVASCULAR ONCE
Status: COMPLETED | OUTPATIENT
Start: 2017-08-14 | End: 2017-08-14

## 2017-08-14 RX ORDER — SODIUM CHLORIDE 9 MG/ML
INJECTION, SOLUTION INTRAVENOUS CONTINUOUS
Status: DISCONTINUED | OUTPATIENT
Start: 2017-08-14 | End: 2017-08-15

## 2017-08-14 RX ORDER — ONDANSETRON 2 MG/ML
4 INJECTION INTRAMUSCULAR; INTRAVENOUS EVERY 6 HOURS PRN
Status: DISCONTINUED | OUTPATIENT
Start: 2017-08-14 | End: 2017-08-16 | Stop reason: HOSPADM

## 2017-08-14 RX ORDER — MORPHINE SULFATE 4 MG/ML
INJECTION, SOLUTION INTRAMUSCULAR; INTRAVENOUS
Status: COMPLETED
Start: 2017-08-14 | End: 2017-08-14

## 2017-08-14 RX ORDER — HYDROCODONE BITARTRATE AND ACETAMINOPHEN 5; 325 MG/1; MG/1
1 TABLET ORAL 4 TIMES DAILY PRN
Status: DISCONTINUED | OUTPATIENT
Start: 2017-08-14 | End: 2017-08-16 | Stop reason: HOSPADM

## 2017-08-14 RX ORDER — KETOROLAC TROMETHAMINE 30 MG/ML
30 INJECTION, SOLUTION INTRAMUSCULAR; INTRAVENOUS ONCE
Status: COMPLETED | OUTPATIENT
Start: 2017-08-14 | End: 2017-08-14

## 2017-08-14 RX ORDER — GUAIFENESIN/DEXTROMETHORPHAN 100-10MG/5
10 SYRUP ORAL EVERY 4 HOURS PRN
Status: DISCONTINUED | OUTPATIENT
Start: 2017-08-14 | End: 2017-08-16 | Stop reason: HOSPADM

## 2017-08-14 RX ORDER — IPRATROPIUM BROMIDE AND ALBUTEROL SULFATE 2.5; .5 MG/3ML; MG/3ML
3 SOLUTION RESPIRATORY (INHALATION)
Status: COMPLETED | OUTPATIENT
Start: 2017-08-14 | End: 2017-08-14

## 2017-08-14 RX ORDER — MORPHINE SULFATE 2 MG/ML
1-2 INJECTION, SOLUTION INTRAMUSCULAR; INTRAVENOUS EVERY 4 HOURS PRN
Status: DISCONTINUED | OUTPATIENT
Start: 2017-08-14 | End: 2017-08-16 | Stop reason: HOSPADM

## 2017-08-14 RX ADMIN — HYDROCODONE BITARTRATE AND ACETAMINOPHEN 1 TABLET: 5; 325 TABLET ORAL at 18:33

## 2017-08-14 RX ADMIN — METHYLPREDNISOLONE SODIUM SUCCINATE 62.5 MG: 125 INJECTION, POWDER, FOR SOLUTION INTRAMUSCULAR; INTRAVENOUS at 18:30

## 2017-08-14 RX ADMIN — SODIUM CHLORIDE 500 MG: 9 INJECTION, SOLUTION INTRAVENOUS at 20:47

## 2017-08-14 RX ADMIN — ALBUTEROL SULFATE 2 PUFF: 90 AEROSOL, METERED RESPIRATORY (INHALATION) at 19:25

## 2017-08-14 RX ADMIN — IPRATROPIUM BROMIDE AND ALBUTEROL SULFATE 3 ML: .5; 3 SOLUTION RESPIRATORY (INHALATION) at 03:32

## 2017-08-14 RX ADMIN — HYDROCODONE BITARTRATE AND ACETAMINOPHEN 1 TABLET: 5; 325 TABLET ORAL at 14:35

## 2017-08-14 RX ADMIN — SODIUM CHLORIDE 1000 ML: 9 INJECTION, SOLUTION INTRAVENOUS at 01:16

## 2017-08-14 RX ADMIN — METHYLPREDNISOLONE SODIUM SUCCINATE 62.5 MG: 125 INJECTION, POWDER, FOR SOLUTION INTRAMUSCULAR; INTRAVENOUS at 06:52

## 2017-08-14 RX ADMIN — HEPARIN SODIUM 5000 UNITS: 10000 INJECTION, SOLUTION INTRAVENOUS; SUBCUTANEOUS at 20:47

## 2017-08-14 RX ADMIN — MORPHINE SULFATE 2 MG: 2 INJECTION, SOLUTION INTRAMUSCULAR; INTRAVENOUS at 12:58

## 2017-08-14 RX ADMIN — AZITHROMYCIN MONOHYDRATE 500 MG: 500 INJECTION, POWDER, LYOPHILIZED, FOR SOLUTION INTRAVENOUS at 09:06

## 2017-08-14 RX ADMIN — ALBUTEROL SULFATE 2 PUFF: 90 AEROSOL, METERED RESPIRATORY (INHALATION) at 12:59

## 2017-08-14 RX ADMIN — IPRATROPIUM BROMIDE AND ALBUTEROL SULFATE 3 ML: .5; 3 SOLUTION RESPIRATORY (INHALATION) at 02:54

## 2017-08-14 RX ADMIN — ALBUTEROL SULFATE 2 PUFF: 90 AEROSOL, METERED RESPIRATORY (INHALATION) at 07:01

## 2017-08-14 RX ADMIN — IBUPROFEN 400 MG: 200 TABLET, FILM COATED ORAL at 23:41

## 2017-08-14 RX ADMIN — MORPHINE SULFATE 4 MG: 4 INJECTION, SOLUTION INTRAMUSCULAR; INTRAVENOUS at 00:54

## 2017-08-14 RX ADMIN — SODIUM CHLORIDE 500 MG: 9 INJECTION, SOLUTION INTRAVENOUS at 14:30

## 2017-08-14 RX ADMIN — IPRATROPIUM BROMIDE AND ALBUTEROL SULFATE 3 ML: .5; 3 SOLUTION RESPIRATORY (INHALATION) at 05:07

## 2017-08-14 RX ADMIN — KETOROLAC TROMETHAMINE 30 MG: 30 INJECTION, SOLUTION INTRAMUSCULAR; INTRAVENOUS at 05:02

## 2017-08-14 RX ADMIN — IPRATROPIUM BROMIDE AND ALBUTEROL SULFATE 3 ML: .5; 3 SOLUTION RESPIRATORY (INHALATION) at 03:21

## 2017-08-14 RX ADMIN — MORPHINE SULFATE 2 MG: 2 INJECTION, SOLUTION INTRAMUSCULAR; INTRAVENOUS at 09:01

## 2017-08-14 RX ADMIN — SODIUM CHLORIDE: 9 INJECTION, SOLUTION INTRAVENOUS at 06:53

## 2017-08-14 RX ADMIN — METHYLPREDNISOLONE SODIUM SUCCINATE 125 MG: 125 INJECTION, POWDER, FOR SOLUTION INTRAMUSCULAR; INTRAVENOUS at 03:33

## 2017-08-14 RX ADMIN — HYDROCODONE BITARTRATE AND ACETAMINOPHEN 1 TABLET: 5; 325 TABLET ORAL at 23:41

## 2017-08-14 RX ADMIN — IPRATROPIUM BROMIDE AND ALBUTEROL SULFATE 3 ML: .5; 3 SOLUTION RESPIRATORY (INHALATION) at 03:45

## 2017-08-14 RX ADMIN — AZITHROMYCIN 500 MG: 250 TABLET, FILM COATED ORAL at 02:50

## 2017-08-14 RX ADMIN — HYDROCODONE BITARTRATE AND ACETAMINOPHEN 1 TABLET: 5; 325 TABLET ORAL at 06:51

## 2017-08-14 RX ADMIN — HEPARIN SODIUM 5000 UNITS: 10000 INJECTION, SOLUTION INTRAVENOUS; SUBCUTANEOUS at 09:10

## 2017-08-14 RX ADMIN — IPRATROPIUM BROMIDE AND ALBUTEROL SULFATE 3 ML: .5; 3 SOLUTION RESPIRATORY (INHALATION) at 16:45

## 2017-08-14 RX ADMIN — IOPAMIDOL 100 ML: 612 INJECTION, SOLUTION INTRAVASCULAR at 01:23

## 2017-08-14 RX ADMIN — IPRATROPIUM BROMIDE AND ALBUTEROL SULFATE 3 ML: .5; 3 SOLUTION RESPIRATORY (INHALATION) at 03:54

## 2017-08-14 RX ADMIN — MORPHINE SULFATE 2 MG: 2 INJECTION, SOLUTION INTRAMUSCULAR; INTRAVENOUS at 19:50

## 2017-08-14 RX ADMIN — METHYLPREDNISOLONE SODIUM SUCCINATE 62.5 MG: 125 INJECTION, POWDER, FOR SOLUTION INTRAMUSCULAR; INTRAVENOUS at 12:52

## 2017-08-14 RX ADMIN — SODIUM CHLORIDE 500 MG: 9 INJECTION, SOLUTION INTRAVENOUS at 08:47

## 2017-08-14 RX ADMIN — IBUPROFEN 400 MG: 200 TABLET, FILM COATED ORAL at 14:35

## 2017-08-14 RX ADMIN — IPRATROPIUM BROMIDE AND ALBUTEROL SULFATE 3 ML: .5; 3 SOLUTION RESPIRATORY (INHALATION) at 09:09

## 2017-08-14 ASSESSMENT — ACTIVITIES OF DAILY LIVING (ADL)
FALL_HISTORY_WITHIN_LAST_SIX_MONTHS: NO
RETIRED_EATING: 0-->INDEPENDENT
BATHING: 0-->INDEPENDENT
SWALLOWING: 0-->SWALLOWS FOODS/LIQUIDS WITHOUT DIFFICULTY
TRANSFERRING: 0-->INDEPENDENT
COGNITION: 0 - NO COGNITION ISSUES REPORTED
DRESS: 0-->INDEPENDENT
RETIRED_COMMUNICATION: 0-->UNDERSTANDS/COMMUNICATES WITHOUT DIFFICULTY
TOILETING: 0-->INDEPENDENT
AMBULATION: 0-->INDEPENDENT

## 2017-08-14 ASSESSMENT — PAIN DESCRIPTION - DESCRIPTORS
DESCRIPTORS: SHARP
DESCRIPTORS: DULL

## 2017-08-14 NOTE — PLAN OF CARE
Patient transferred to ICU per MD orders. On 8 L HFNC at this time. A&O but lethargic.     Face to face report given with opportunity to observe patient.    Report given to Mariam Hoffman   8/14/2017  7:39 AM

## 2017-08-14 NOTE — PLAN OF CARE
Problem: Patient Goal: Rt Focus  Goal: 1. Patient Goal: RT Focus  [facility] - Respiratory Clinical Assessment     Current Patient History:    Respiratory History: asthma    Smoking History: 1/2 ppd    Oxygen dependency: No,    Oxygen prescribed: none     8/14/2017 5:24 PM Patient Initial Assessment:     Level of Consciousness: alert , cooperative    Skin color: pink    Lung sounds:crackles rll    Respirations:  Normal with easy respirations and no use of accessory muscles to breathe    Respiratory symptoms: no other unusual symptoms    Cough/Sputum:  dry    Current oxygenation status: 92% 6 lpm nc

## 2017-08-14 NOTE — ED NOTES
While sleeping patient continues to desat to 85-86%. Patient now on 6L of O2 via NC with sats 90%. Patient to be admitted to hospital due to hypoxia, awaiting bed placement.

## 2017-08-14 NOTE — PROGRESS NOTES
Tele-ICU Intensivist note    I was asked to assess patient by Dr. Vogel, and am grateful for his phone call and report.     Patient is a 31 yo female admitted last evening with right sided chest pain/flank pain, and she has had increasing hypoxemia overnight, and is now on 9-10 liters of NC oxygen. She also states she has had recent symptoms of asthma.     Her lung exam was noted for having diffuse wheezes and rhonchi. On AV assessment from [facility], she appeared comfortable on NC oxygen.     Her CXR last evening was clear, but CT of abdomen and pelvis was suspicious for infiltrates at both lung bases.     She was started on solumedrol and bronchodilators for her asthma, and antibiotics (Zithromax and Meropenem) for possible pneumonia. I support this plan but requested he repeat her CXR today. If she has infiltrates, then this confirms that she does indeed likely have a primary pulmonary/pneumonic process. If negative, I recommend doing a spiral CT of chest to rule out PE; this will also give us a better look at the parenchyma of both lungs.     At present, she is compensated and can remain in [facility] ICU. If any questions arise, we will be happy to accept her here at [facility] at your discretion.    martha green  August 14, 2017

## 2017-08-14 NOTE — PROGRESS NOTES
CXR still without obvious infiltrate, but clinically improving in regard to O2 sats with treatment as noted.  Hence will hold off on CT chest unless worsening is noted.  Follow up Lactic Acid at 7pm.    Marky Vogel, DO

## 2017-08-14 NOTE — DISCHARGE INSTRUCTIONS
Sleep lab has been notified that you need an appointment set up for sleep study and PFT. They will call you to schedule an appointment that fits your schedule.    Also follow up with Dr. Burk as scheduled on September 21st at 9:00 AM, unless need arises to see him earlier.

## 2017-08-14 NOTE — PLAN OF CARE
Patient admitted to floor at approximately 0515 from ER with hypoxia and right sided flank pain. Patient ambulated to bathroom once in room and sat's were noted to be 82% on RA when she returned to bed. Patient was asymptomatic. O2 placed back on at 5 L and has been slowly turned up to 7 L. Humidification added. Sat's continue to fluctuate between 85-91% on the 7 L. Patient is very lethargic but does around to voice easily. Pain on admission rated 7/10 on right side. Lungs are coarse with insp/exp wheezes and dim in the bases. Cough continues.

## 2017-08-14 NOTE — PLAN OF CARE
"[facility] Observation Note:  Patient is registered to observation and is in 3114/3114-1 at approximately 0515 via cart accompanied by transport tech from emergency room . Report received from Priscilla BURKS in SBAR format at 0505 via telephone. Patient transferred to bed via self.. Patient is alert and oriented X 3, reports pain; rates at 7 on 0-10 scale.  Patient oriented to room, unit, hourly rounding, and plan of care. Explained the admission packet including \"What is Observation Status\" and patient handbook with patient bill of rights brochure. Will continue to monitor and document as needed.  Nursing Observation criteria listed below was met:    Health care directives status obtained and documented: Yes    Care Everywhere authorization completed No      MRSA swab completed for patient 65 years and older: N/A      If initial lactic acid >2.0, repeat lactic acid drawn within one hour of arrival to unit: NA. If no, state reason: Lactic was not drawn       Patient identifies a surrogate decision maker: Yes If yes, who:Khang- Contact Information:see facesheet    Vaccination assessment and education completed: Yes   Vaccinations received prior to hospitalization: Pneumovax yes  Influenza(seasonal)  N/A   Vaccination(s) ordered: patient does not meet criteria      Skin issues/needs documented:NA    Isolation Patient: no Education given and documented, correct sign in place and documentation row added to PCS:  No      Fall Prevention: Observation fall risk completed:  No Education given and documented, sticker and magnet in place: No      General Care Plan initiated with observation goal(s): Yes    Education (including assessment) Documented: Yes    New medication information given to patient and documented: Yes    Patient elects to use own medications from home during hospitalization:  No If yes, a MD order was obtained to use Medications from Home:  N/A and home medications were sent to Pharmacy for " verification for use during hospitalization: N/A    Patient has discharge needs (If yes, please explain): unsure at this time

## 2017-08-14 NOTE — PLAN OF CARE
Problem: Goal Outcome Summary  Goal: Goal Outcome Summary  Outcome: No Change  Patient on 6L of HiFlo oxygen now with really no difference in sats than at 9-10L. Better air movement in lungs this afternoon since this am. Crackles in right base and inspiratory and expiratory wheezes noted in left base. Skin color pale. IV patent. Started number 20 angio started in left hand. Patient has IV lock in right AC now. FLushes easily. Continues to have right flank pain that has radiated to her back after coughing hard. Patient states the pain increases with taking a deep breath. Ice lionel given . Has been medicated with morphine, advil and norco. Voids in small amounts but urine getting less concentrated as the day goes on. Trace edema noted to feet and ankles bilateally. SCD's on. Having echo . Lactic acid 5.1. Will repeat at 1900. Blood pressure stable and has been afebrile.

## 2017-08-14 NOTE — PLAN OF CARE
Spoke with Dr. Vogel about patient's status. Would like patient placed on HFNC at this time to try and increase sats, RT paged.

## 2017-08-14 NOTE — PLAN OF CARE
Patient continues to be very lethargic but arouses easily to voice. When sleeping sats noted to be 85-87% on 7 L NC, when encouraged to deep breath through her nose sats do increase to 90-92% on the 7 L O2 but then patient is noted to fall right back to sleep and sat's decrease back down to mid 80's.

## 2017-08-14 NOTE — PLAN OF CARE
Spoke with RT about patient's O2 status. Currently on 7 L NC with sat's fluctuating. RT stated in past admissions patient normally will run in the upper 80's to low 90's. Patient is asymptomatic at this time. Will leave at the 7 L and contact new Hospitalist for orders.

## 2017-08-14 NOTE — H&P
PRIMARY CARE PHYSICIAN:  Chencho Burk      CHIEF COMPLAINT:    1.  Right flank pain.   2.  Asthma exacerbation.      HISTORY OF PRESENT ILLNESS:  Ms. Cassandra Camejo is a 32-year-old female with a history of asthma and tobacco abuse who presents to the emergency department early this morning with complaints of right flank pain along with recent symptoms of asthma exacerbation.      In discussion with Ms. Camejo she states that she began to experience right flank pain about a week ago.  She states that initially the pain was mild, however, has progressed throughout the week.  States that the pain is accompanied by some nausea.  She has not had any fevers that she has known of, however.  There is no report of vomiting or diarrhea.  The pain is localized to the right flank just below the right rib cage and it is wrapping around towards the back.  She denies any previous episodes of pain such as this.  She does have a history of laparoscopic cholecystectomy over 1 year ago.      In addition to these symptoms of right flank pain which has increased over the course of the last week, she reports that her asthma has been worsened over the course of the last 2-3 days.  She does report some sputum production with yellow colored sputum.  She also reports shortness of breath and wheezing.  She is a smoker, however, states that she quit smoking about 3 days ago.  Again, she denies any fevers associated with this presentation.      In the Emergency Department, she did undergo a CT scan due to her reported flank pain.  There is no report of abnormalities on the CT scan except for the possibility of gastroenteritis.  She was also noted to have some possible ileus.  Otherwise, there were no acute abnormalities and the appendix was said to be without inflammation, however, this is per secondary report from the ER physician.  Initially attempts were made to discharge patient home, however, due to ongoing pain and more notably the fact  that she continued to have hypoxia on room air down to 83% and 84%, she was subsequently hospitalized.      PAST MEDICAL HISTORY:   1.  Asthma.   2.  Tobacco abuse.   3.  Chronic pain in her ankles.   4.  Obesity.       PAST SURGICAL HISTORY:  Laparoscopic cholecystectomy approximately 1 year ago in 06/2016.      MEDICATIONS:  Prior to admission:     1.  Hydrocodone 1 tablet 4 times daily for pain (in her ankles).   2.  ProAir 2 puffs every 6 hours as needed.   3.  Tessalon Perles 1 capsule 3 times daily as needed for cough.   4.  Robitussin p.r.n.    5.  DuoNebs every 4 hours as needed.   6.  Nicotine gum.      ALLERGIES:  Penicillin and cats.      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  She does have a history of smoking, however, reports that she quit smoking about 3 days ago.      FAMILY HISTORY:  Remarkable for a father with asthma, mother who has rheumatoid arthritis and a sister who has diabetes mellitus.      REVIEW OF SYSTEMS:  A 10 point review of systems was obtained.  Pertinent positives are included in the HPI, all other systems reviewed and found to be negative.      PHYSICAL EXAMINATION:   VITAL SIGNS:  Blood pressure currently is 130/66, respirations 20, O2 sat is 93% on 7 liters nasal cannula.  Temperature 98.0, heart rate is 93.   GENERAL:  The patient is alert and oriented x3, however, she does seem a bit fatigued and lethargic.  There is some mild accessory muscle use noted.   HEENT:  Pupils were equal and reactive.  Oropharynx clear.   NECK:  Supple.   HEART:  Somewhat distant, however, regular.   LUNGS:  Remarkable for diffuse wheezes and rhonchi bilaterally.   ABDOMEN:  Obese, soft with profound tenderness in the right upper quadrant and right flank upon palpation.   EXTREMITIES:  With trace lower extremity edema.   INTEGUMENT:  No rashes noted.   NEUROLOGIC:  No focal deficits noted.      LABORATORY DATA:  As outlined above.  There is no clear indication as to  her symptoms  based upon the CT scan, chest x-ray and her presentation.  There is some thought that her symptoms are likely related to a right lower lobe infiltrate and/or pneumonia.  We are awaiting the official x-ray and CT results at this time.  CBC with a WBC of 17.6, platelets are 335 and hemoglobin 13.2, glucose 128, sodium 139, potassium 3.6, BUN is 10, creatinine 0.68.  Amylase normal, lipase is also normal at 83.  Urine pregnancy is negative.  D-dimer negative at 236.      ASSESSMENT AND PLAN:  Ms. Camejo is a 32-year-old female with a history of asthma, tobacco abuse and obesity who presents with 1 week of right flank pain along with noted hypoxia.   1.  Pulmonary. Acute Hypoxic Respiratory Failure due to possible pneumonia with asthma exacerbation: With her presentation and history of asthma, she has now been hospitalized and actually admitted to the ICU.  She is on high flow nasal cannula and is requiring about 7 liters to maintain O2 sats above 90%.  We will start antibiotic therapy.  Due to her penicillin allergy, she will be placed on azithromycin and meropenem.  We will further assess imaging once radiology is available.  In addition, IV Solu-Medrol and neb treatments will be made available and scheduled.  The D-dimer was normal at 236, making pulmonary embolism unlikely, however, that slight possibility still exist.  Nevertheless, at this time it is likely more reasonable to treat her more aggressively for her asthma exacerbation and community-acquired pneumonia.     2.  Gastrointestinal.  As outlined above, the patient does have some exquisite tenderness in the right upper quadrant and right flank upon palpation.  CT scan was un-telling however, with the persistence of this pain, it is possible that pneumonia is the culprit in this case.  Hence, we will continue to treat her for this.  If pain persists, additional considerations may be warranted such as surgical consultation, however I do not think  that is necessary at this time as I think it is more important to focus on her asthma exacerbation and the underlying concern for pneumonia.   3.  Cardiac.  The patient's cardiac status appears stable at this time.   4.  Renal.  The patient's renal status is stable at this time.     5.  Fluid, electrolytes and nutrition.  The patient will be placed on regular diet as tolerated.     6.  Deep vein thrombosis prophylaxis will be administered in the way of subcu heparin.       DISPOSITION:  Likely in 2-4 days once clinical improvement is noted.      CODE STATUS:  Full.         BONNIE HUNT DO             D: 2017 07:14   T: 2017 07:41   MT: ODALIS      Name:     BRITTANY CALVIN   MRN:      [id number]        Account:      [id number]   :      1985           Admitted:     [id number]      Document: [id number]       cc: Varinder Burk MD

## 2017-08-14 NOTE — ED NOTES
O2 via NC removed by Dr. Flores about 15 minutes ago. Patient ambulated around unit, returned to room with sats 85-86% on RA. Dr. Flores updated. Order to be placed for duo neb.

## 2017-08-14 NOTE — PHARMACY
[facility]    Pharmacy      Antimicrobial Stewardship Note     Current antimicrobial therapy:  Anti-infectives (Future)    Start     Dose/Rate Route Frequency Ordered Stop    08/15/17 0715  azithromycin (ZITHROMAX) 250 mg in NaCl 0.9 % 250 mL intermittent infusion      250 mg  over 1 Hours Intravenous EVERY 24 HOURS 08/14/17 0702 08/19/17 0714    08/14/17 0715  meropenem (MERREM) 500 mg in NaCl 0.9 % 50 mL intermittent infusion      500 mg  over 30 Minutes Intravenous EVERY 6 HOURS 08/14/17 0702      08/14/17 0000  azithromycin (ZITHROMAX Z-VANDA) 250 MG tablet         08/14/17 0232 08/19/17 2359          Indication: CAP     Pertinent labs:  Creatinine   Creatinine   Date Value Ref [facility] Status   08/13/2017 0.68 0.52 - 1.04 mg/dL Final   09/17/2016 0.58 0.52 - 1.04 mg/dL Final   09/16/2016 0.49 (L) 0.52 - 1.04 mg/dL Final     WBC   WBC   Date Value Ref Range Status   08/13/2017 17.6 (H) 4.0 - 11.0 10e9/L Final   09/17/2016 15.6 (H) 4.0 - 11.0 10e9/L Final   09/16/2016 19.5 (H) 4.0 - 11.0 10e9/L Final     ANC No components found for: ANC     Culture Results:     Blood culture [[id number]]        Order Status: Sent Specimen: Blood        Blood culture [[id number]]        Order Status: Sent Specimen: Blood        Sputum Culture Aerobic Bacterial [[id number]]        Order Status: No result Specimen: Sputum        Gram stain [[id number]]        Order Status: No result Specimen: Sputum                 Recommendations/Interventions:  1. Recommend replacing azithromycin/meropenem with levofloxacin/aztreonam, per IDSA/ATS guidelines.  Everette Bearden [facility]  August 14, 2017

## 2017-08-14 NOTE — PROGRESS NOTES
Spoke with Dr Adams.  Case discussed and recommended repeat CXR.  If infiltrate more evident on CXR today treat for Pneumonia.  If still no prominent infiltrate consider CT to r/o PE.  Very much appreciated his assistance.      Marky Vogel, DO

## 2017-08-14 NOTE — PLAN OF CARE
Medicated with one lortab now and 400 mg of advil now for complaints of right upper abdominal pain that's radiating to the back. Patient states she was coughing hard and she twisted and felt something pop. Pain was almost gone earlier she states. Ice lionel also given at this time to tight flank area. . Sats 93% on 10L.

## 2017-08-14 NOTE — PLAN OF CARE
Problem: Patient Goal: Social Work Focus  Goal: 1. Patient Goal: Social Work Focus  See flow sheet for completed assessment.       LOC: alert and oriented            Others present:  No one      Dx: acute bronchitis, possible pneumonia   Chronic Disease: asthma   HENRY:  none  ED Visits: none        Primary PCP: Dr. Burk  Health Care Directive:  not completed; denied information  Pharmacy: [facility]   Meds and appointments management: self managed        Lives with: , two children and pets  Living at: home  Equipment used:  none  Support System: , Bryce  : not connected   ADLs:  independent  Falls: denies  Fall prevention resources given: n/a    Able to Return to Prior Living Arrangements: yes     : no  Community/social activity/work: enjoys fishing, children involved in school activities, works part time at [employer]  Transportation: self  Home care/ county services: not connected  Adequate Resources for needs (housing, utilities, food/med): yes  Mental health: denies  Substance abuse: recently quit smoking, denies drug and alcohol use   Exposure to violence/abuse: denies  Stressors: denies               Goals: return home      Barriers: no barriers identified       Needs: no needs identified      Plan: return home      Marcela lives independently at home with her , Bryce, 16 year old son, Brent and 14 year old daughter, Vanesa.  They also have dogs, cats and chickens at home.  She is independent with ADL's, medications, finances and driving.  Cooking and cleaning are shared tasks between herself and Bryce.  Bryce does the majority of the yard work.  Marcela works part time at [employer].  She is not connected with support services or a .  She has not completed a health care directive and denies interest in information.  She denies concern with sleep and mood.  She recently quit smoking.  Denies drug and alcohol use.  She identifies Bryce as her support  system.  She enjoys fishing.       Marcela see Dr. Burk for primary care and was seen last week.  She goes to [facility] for dental care and was last seen about 6-7 months ago.  She was last seen at [facility] within the last year.  She uses [facility] for her medications.       Marcela is eager to return home.  States that Bryce will provide transportation upon discharge.  She denies questions or concerns.   will remain available for support and resources.

## 2017-08-14 NOTE — ED NOTES
Patient aware of plan of care with no questions or concerns. Respiratory at bedside prior to transfer, will continue nebs upstairs. Sats 90%. Belongings sent with patient. Report called to Aicha.

## 2017-08-14 NOTE — PROVIDER NOTIFICATION
DATE:  8/14/2017   TIME OF RECEIPT FROM LAB: 1734  LAB TEST:  Lactic acid  LAB VALUE: 5.1  RESULTS GIVEN WITH READ-BACK To Dr. Vogel   TIME LAB VALUE REPORTED TO PROVIDER:   3971

## 2017-08-14 NOTE — ED NOTES
Patient presents accompanied by significant other with complaint of right flank pain. Pain started 4-5 days ago, however has worsened today. The pain is sharp and is intermittent. Currently rating the pain 9/10. Patient is prescribed Norco at home and has taken that today. Denies hematuria/dysuria. Febrile on arrival, however patient states she has not noticed fevers at home. Patient also states she is trying to quit smoking, last cigarette was last evening. Patient states she is short of breath, sats 88% on RA. NC applied at 2L with sats up to 93-94%. Expiratory wheeze noted. Patient also reports yellow/green sputum with cough. Denies chest pain. Attached to monitors, IV placed, and call light within reach.

## 2017-08-14 NOTE — ED PROVIDER NOTES
History     Chief Complaint   Patient presents with     Flank Pain     Ongoing for 4-5 days. Today the pain is worse. Currently rating the pain 9/10. Denies history of kidney stones     HPI  Cassandra Camejo is a 32 year old female who presents today with complaints of right chest wall pain. Patient states sx began 3 - 4 days ago and gradually worsening. Has a history of asthma and chronic cough which sometimes causes some rib pain. But sx today more severe prompting ER visit. No additional complaints. Has been in usual state of health otherwise.     I have reviewed the Medications, Allergies, Past Medical and Surgical History, and Social History in the Epic system.    Allergies:   Allergies   Allergen Reactions     Cats Shortness Of Breath     Penicillins Rash         No current facility-administered medications on file prior to encounter.   Current Outpatient Prescriptions on File Prior to Encounter:  ipratropium - albuterol 0.5 mg/2.5 mg/3 mL (DUONEB) 0.5-2.5 (3) MG/3ML nebulization Take 1 vial (3 mLs) by nebulization every 4 hours as needed for wheezing   HYDROcodone-acetaminophen (NORCO) 5-325 MG per tablet Take 1 tablet by mouth 4 times daily as needed for moderate to severe pain    IBUPROFEN PO Take 400 mg by mouth every 6 hours as needed for moderate pain   albuterol (PROAIR HFA, PROVENTIL HFA, VENTOLIN HFA) 108 (90 BASE) MCG/ACT inhaler Inhale 2 puffs into the lungs every 6 hours   benzonatate (TESSALON) 100 MG capsule Take 1 capsule (100 mg) by mouth 3 times daily as needed for cough   guaiFENesin-dextromethorphan (ROBITUSSIN DM) 100-10 MG/5ML syrup Take 10 mLs by mouth every 4 hours as needed for cough   order for DME Equipment being ordered: Other: nebulization machineTreatment Diagnosis: acute asthma exacerbation   nicotine (NICODERM CQ) 14 MG/24HR patch 2h hr Place 1 patch onto the skin every 24 hours   nicotine polacrilex (RA NICOTINE) 2 MG gum Place 1 each (2 mg) inside cheek as needed for smoking  "cessation       Patient Active Problem List   Diagnosis     Acute cholecystitis     Acute asthma exacerbation     Viral syndrome     Hyperglycemia     Fever, unspecified       Past Surgical History:   Procedure Laterality Date     LAPAROSCOPIC CHOLECYSTECTOMY N/A 6/27/2016    Procedure: LAPAROSCOPIC CHOLECYSTECTOMY;  Surgeon: Arjun Harmon MD;  Location: [facility] OR       Social History   Substance Use Topics     Smoking status: Current Every Day Smoker     Packs/day: 0.50     Years: 13.00     Smokeless tobacco: Not on file     Alcohol use No       Most Recent Immunizations   Administered Date(s) Administered     Pneumococcal 23 valent 09/16/2016       BMI: Estimated body mass index is 39.94 kg/(m^2) as calculated from the following:    Height as of this encounter: 1.651 m (5' 5\").    Weight as of this encounter: 108.9 kg (240 lb).      Review of Systems   Constitutional: Negative.  Negative for chills and fever.   HENT: Negative.    Eyes: Negative.    Respiratory: Negative.  Negative for shortness of breath.    Cardiovascular: Negative.    Endocrine: Negative.    Genitourinary: Negative.    Musculoskeletal: Negative.    Skin: Negative.    Allergic/Immunologic: Negative.    Neurological: Negative.    Hematological: Negative.    Psychiatric/Behavioral: Negative.        Physical Exam   BP: 141/65  Pulse: 107  Heart Rate: 102  Temp: 100  F (37.8  C)  Resp: 20  Height: 165.1 cm (5' 5\")  Weight: 108.9 kg (240 lb)  SpO2: (!) 90 %  Physical Exam   Constitutional: She appears well-developed and well-nourished.   HENT:   Mouth/Throat: Oropharynx is clear and moist.   Eyes: Conjunctivae and EOM are normal.   Neck: Normal range of motion. Neck supple.   Cardiovascular: Normal rate, regular rhythm and normal heart sounds.    Pulmonary/Chest: Effort normal and breath sounds normal. No respiratory distress. She has no wheezes. She has no rales. She exhibits tenderness (Tenderness with palpation over right lower thorax region). "   Abdominal: Soft. Bowel sounds are normal.   Musculoskeletal: Normal range of motion.   Neurological: She is alert.   Skin: Skin is warm and dry.   Psychiatric: She has a normal mood and affect.       ED Course     ED Course     Procedures           Labs Ordered and Resulted from Time of ED Arrival Up to the Time of Departure from the ED   CBC WITH PLATELETS DIFFERENTIAL - Abnormal; Notable for the following:        Result Value    WBC 17.6 (*)     Absolute Neutrophil 13.0 (*)     Absolute Monocytes 1.4 (*)     All other components within normal limits   COMPREHENSIVE METABOLIC PANEL - Abnormal; Notable for the following:     Glucose 128 (*)     All other components within normal limits   AMYLASE - Abnormal; Notable for the following:     Amylase 23 (*)     All other components within normal limits   URINE MACROSCOPIC WITH REFLEX TO MICRO - Abnormal; Notable for the following:     Protein Albumin Urine 10 (*)     Bacteria Urine None (*)     Squamous Epithelial /HPF Urine 3 (*)     Mucous Urine Present (*)     All other components within normal limits   D-DIMER (FV [facility]ANGE)   LIPASE   HCG QUALITATIVE URINE     CXR: Possible right lower lobe infiltrate  CT of abdomen: Mild ileus and non-specific gastroenteritis. Hepatomegaly noted. No abscess noted. No evidence of appendicitis.   Assessments & Plan (with Medical Decision Making)     I have reviewed the nursing notes.    I have reviewed the findings, diagnosis, plan and need for follow up with the patient.    New Prescriptions    AZITHROMYCIN (ZITHROMAX Z-VANDA) 250 MG TABLET    Two tablets on the first day, then one tablet daily for the next 4 days       Final diagnoses:   Acute bronchitis, unspecified organism   Abdominal pain, generalized       8/13/2017   [facility]  32 year old with complaints of right sided flank and abd pain. Labs as above. CT showed no acute intra-abdominal process except for possible gastroenteritis. Patient to be discharged home.  Will treat for possible pneumonia with z-pack. Patient ambulated in ER and sats remained at on RA. Patient told to stop smoking    5.01am Addendum: Patient's sats remained persistently low. Given duo neb tx of 1.5 hours, solumedrol and still no improvement. When patient falls asleep, oxygenation dips to the mid-eighties. With ambulation she goes even lower to 83-84%. Suspect patient may have baseline hypoxia which worsens with even mild infection. Plan: admit for hypoxia and possible pneumonia. Dr. Jiang notified and agrees to admit.      Juice Flores MD  08/14/17 0640

## 2017-08-15 LAB
ALBUMIN SERPL-MCNC: 3.1 G/DL (ref 3.4–5)
ALP SERPL-CCNC: 51 U/L (ref 40–150)
ALT SERPL W P-5'-P-CCNC: 18 U/L (ref 0–50)
ANION GAP SERPL CALCULATED.3IONS-SCNC: 10 MMOL/L (ref 3–14)
AST SERPL W P-5'-P-CCNC: 11 U/L (ref 0–45)
BASOPHILS # BLD AUTO: 0 10E9/L (ref 0–0.2)
BASOPHILS NFR BLD AUTO: 0.1 %
BILIRUB SERPL-MCNC: 0.2 MG/DL (ref 0.2–1.3)
BUN SERPL-MCNC: 8 MG/DL (ref 7–30)
CALCIUM SERPL-MCNC: 8.5 MG/DL (ref 8.5–10.1)
CHLORIDE SERPL-SCNC: 107 MMOL/L (ref 94–109)
CO2 SERPL-SCNC: 19 MMOL/L (ref 20–32)
CREAT SERPL-MCNC: 0.47 MG/DL (ref 0.52–1.04)
DIFFERENTIAL METHOD BLD: ABNORMAL
EOSINOPHIL # BLD AUTO: 0 10E9/L (ref 0–0.7)
EOSINOPHIL NFR BLD AUTO: 0 %
ERYTHROCYTE [DISTWIDTH] IN BLOOD BY AUTOMATED COUNT: 13.3 % (ref 10–15)
GFR SERPL CREATININE-BSD FRML MDRD: ABNORMAL ML/MIN/1.7M2
GLUCOSE BLDC GLUCOMTR-MCNC: 167 MG/DL (ref 70–99)
GLUCOSE BLDC GLUCOMTR-MCNC: 211 MG/DL (ref 70–99)
GLUCOSE BLDC GLUCOMTR-MCNC: 232 MG/DL (ref 70–99)
GLUCOSE BLDC GLUCOMTR-MCNC: 254 MG/DL (ref 70–99)
GLUCOSE SERPL-MCNC: 177 MG/DL (ref 70–99)
GLUCOSE SERPL-MCNC: 179 MG/DL (ref 70–99)
GRAM STN SPEC: ABNORMAL
HCT VFR BLD AUTO: 34.9 % (ref 35–47)
HGB BLD-MCNC: 12 G/DL (ref 11.7–15.7)
IMM GRANULOCYTES # BLD: 0.2 10E9/L (ref 0–0.4)
IMM GRANULOCYTES NFR BLD: 0.8 %
LACTATE SERPL-SCNC: 1.7 MMOL/L (ref 0.4–2)
LYMPHOCYTES # BLD AUTO: 1.2 10E9/L (ref 0.8–5.3)
LYMPHOCYTES NFR BLD AUTO: 5.7 %
MCH RBC QN AUTO: 29.8 PG (ref 26.5–33)
MCHC RBC AUTO-ENTMCNC: 34.4 G/DL (ref 31.5–36.5)
MCV RBC AUTO: 87 FL (ref 78–100)
MICRO REPORT STATUS: ABNORMAL
MONOCYTES # BLD AUTO: 0.5 10E9/L (ref 0–1.3)
MONOCYTES NFR BLD AUTO: 2.6 %
NEUTROPHILS # BLD AUTO: 18.5 10E9/L (ref 1.6–8.3)
NEUTROPHILS NFR BLD AUTO: 90.8 %
NRBC # BLD AUTO: 0 10*3/UL
NRBC BLD AUTO-RTO: 0 /100
PLATELET # BLD AUTO: 278 10E9/L (ref 150–450)
POTASSIUM SERPL-SCNC: 4 MMOL/L (ref 3.4–5.3)
PROT SERPL-MCNC: 7.3 G/DL (ref 6.8–8.8)
RBC # BLD AUTO: 4.03 10E12/L (ref 3.8–5.2)
SODIUM SERPL-SCNC: 136 MMOL/L (ref 133–144)
SPECIMEN SOURCE: ABNORMAL
WBC # BLD AUTO: 20.4 10E9/L (ref 4–11)

## 2017-08-15 PROCEDURE — 87205 SMEAR GRAM STAIN: CPT | Performed by: INTERNAL MEDICINE

## 2017-08-15 PROCEDURE — 40000275 ZZH STATISTIC RCP TIME EA 10 MIN

## 2017-08-15 PROCEDURE — 00000146 ZZHCL STATISTIC GLUCOSE BY METER IP

## 2017-08-15 PROCEDURE — 85025 COMPLETE CBC W/AUTO DIFF WBC: CPT | Performed by: INTERNAL MEDICINE

## 2017-08-15 PROCEDURE — A9270 NON-COVERED ITEM OR SERVICE: HCPCS | Mod: GY | Performed by: INTERNAL MEDICINE

## 2017-08-15 PROCEDURE — 80053 COMPREHEN METABOLIC PANEL: CPT | Performed by: INTERNAL MEDICINE

## 2017-08-15 PROCEDURE — 94640 AIRWAY INHALATION TREATMENT: CPT

## 2017-08-15 PROCEDURE — 94640 AIRWAY INHALATION TREATMENT: CPT | Mod: 76

## 2017-08-15 PROCEDURE — 25000128 H RX IP 250 OP 636: Performed by: INTERNAL MEDICINE

## 2017-08-15 PROCEDURE — 71275 CT ANGIOGRAPHY CHEST: CPT | Mod: TC

## 2017-08-15 PROCEDURE — 25000125 ZZHC RX 250: Performed by: INTERNAL MEDICINE

## 2017-08-15 PROCEDURE — 20000003 ZZH R&B ICU

## 2017-08-15 PROCEDURE — 87070 CULTURE OTHR SPECIMN AEROBIC: CPT | Performed by: INTERNAL MEDICINE

## 2017-08-15 PROCEDURE — 25000132 ZZH RX MED GY IP 250 OP 250 PS 637: Mod: GY | Performed by: INTERNAL MEDICINE

## 2017-08-15 PROCEDURE — 36415 COLL VENOUS BLD VENIPUNCTURE: CPT | Performed by: INTERNAL MEDICINE

## 2017-08-15 PROCEDURE — 99232 SBSQ HOSP IP/OBS MODERATE 35: CPT | Performed by: INTERNAL MEDICINE

## 2017-08-15 RX ORDER — PREDNISONE 20 MG/1
40 TABLET ORAL DAILY
Status: DISCONTINUED | OUTPATIENT
Start: 2017-08-15 | End: 2017-08-15

## 2017-08-15 RX ORDER — METHYLPREDNISOLONE SODIUM SUCCINATE 125 MG/2ML
60 INJECTION, POWDER, LYOPHILIZED, FOR SOLUTION INTRAMUSCULAR; INTRAVENOUS EVERY 6 HOURS
Status: DISCONTINUED | OUTPATIENT
Start: 2017-08-15 | End: 2017-08-15

## 2017-08-15 RX ORDER — PREDNISONE 20 MG/1
40 TABLET ORAL DAILY
Status: COMPLETED | OUTPATIENT
Start: 2017-08-15 | End: 2017-08-16

## 2017-08-15 RX ORDER — LIDOCAINE 40 MG/G
CREAM TOPICAL
Status: DISCONTINUED | OUTPATIENT
Start: 2017-08-15 | End: 2017-08-16 | Stop reason: HOSPADM

## 2017-08-15 RX ORDER — IOPAMIDOL 755 MG/ML
75 INJECTION, SOLUTION INTRAVASCULAR ONCE
Status: COMPLETED | OUTPATIENT
Start: 2017-08-15 | End: 2017-08-15

## 2017-08-15 RX ADMIN — BENZONATATE 100 MG: 100 CAPSULE, LIQUID FILLED ORAL at 21:02

## 2017-08-15 RX ADMIN — HEPARIN SODIUM 5000 UNITS: 10000 INJECTION, SOLUTION INTRAVENOUS; SUBCUTANEOUS at 20:59

## 2017-08-15 RX ADMIN — ALBUTEROL SULFATE 2 PUFF: 90 AEROSOL, METERED RESPIRATORY (INHALATION) at 07:25

## 2017-08-15 RX ADMIN — SODIUM CHLORIDE 500 MG: 9 INJECTION, SOLUTION INTRAVENOUS at 08:29

## 2017-08-15 RX ADMIN — PREDNISONE 40 MG: 20 TABLET ORAL at 09:27

## 2017-08-15 RX ADMIN — GUAIFENESIN AND DEXTROMETHORPHAN 10 ML: 100; 10 SYRUP ORAL at 19:40

## 2017-08-15 RX ADMIN — IBUPROFEN 400 MG: 200 TABLET, FILM COATED ORAL at 15:31

## 2017-08-15 RX ADMIN — BENZONATATE 100 MG: 100 CAPSULE, LIQUID FILLED ORAL at 05:30

## 2017-08-15 RX ADMIN — HYDROCODONE BITARTRATE AND ACETAMINOPHEN 1 TABLET: 5; 325 TABLET ORAL at 08:29

## 2017-08-15 RX ADMIN — METHYLPREDNISOLONE SODIUM SUCCINATE 62.5 MG: 125 INJECTION, POWDER, FOR SOLUTION INTRAMUSCULAR; INTRAVENOUS at 00:43

## 2017-08-15 RX ADMIN — HYDROCODONE BITARTRATE AND ACETAMINOPHEN 1 TABLET: 5; 325 TABLET ORAL at 19:40

## 2017-08-15 RX ADMIN — IBUPROFEN 400 MG: 200 TABLET, FILM COATED ORAL at 08:29

## 2017-08-15 RX ADMIN — ALBUTEROL SULFATE 2 PUFF: 90 AEROSOL, METERED RESPIRATORY (INHALATION) at 19:25

## 2017-08-15 RX ADMIN — ALBUTEROL SULFATE 2 PUFF: 90 AEROSOL, METERED RESPIRATORY (INHALATION) at 00:59

## 2017-08-15 RX ADMIN — MORPHINE SULFATE 2 MG: 2 INJECTION, SOLUTION INTRAMUSCULAR; INTRAVENOUS at 11:54

## 2017-08-15 RX ADMIN — AZITHROMYCIN MONOHYDRATE 250 MG: 500 INJECTION, POWDER, LYOPHILIZED, FOR SOLUTION INTRAVENOUS at 07:10

## 2017-08-15 RX ADMIN — MORPHINE SULFATE 2 MG: 2 INJECTION, SOLUTION INTRAMUSCULAR; INTRAVENOUS at 04:51

## 2017-08-15 RX ADMIN — IOPAMIDOL 75 ML: 755 INJECTION, SOLUTION INTRAVASCULAR at 11:10

## 2017-08-15 RX ADMIN — GUAIFENESIN AND DEXTROMETHORPHAN 10 ML: 100; 10 SYRUP ORAL at 15:32

## 2017-08-15 RX ADMIN — BENZONATATE 100 MG: 100 CAPSULE, LIQUID FILLED ORAL at 15:32

## 2017-08-15 RX ADMIN — ALBUTEROL SULFATE 2 PUFF: 90 AEROSOL, METERED RESPIRATORY (INHALATION) at 12:57

## 2017-08-15 RX ADMIN — METHYLPREDNISOLONE SODIUM SUCCINATE 62.5 MG: 125 INJECTION, POWDER, FOR SOLUTION INTRAMUSCULAR; INTRAVENOUS at 07:10

## 2017-08-15 RX ADMIN — SODIUM CHLORIDE 500 MG: 9 INJECTION, SOLUTION INTRAVENOUS at 03:22

## 2017-08-15 RX ADMIN — MORPHINE SULFATE 2 MG: 2 INJECTION, SOLUTION INTRAMUSCULAR; INTRAVENOUS at 20:59

## 2017-08-15 RX ADMIN — HYDROCODONE BITARTRATE AND ACETAMINOPHEN 1 TABLET: 5; 325 TABLET ORAL at 15:32

## 2017-08-15 RX ADMIN — HEPARIN SODIUM 5000 UNITS: 10000 INJECTION, SOLUTION INTRAVENOUS; SUBCUTANEOUS at 08:29

## 2017-08-15 ASSESSMENT — PAIN DESCRIPTION - DESCRIPTORS
DESCRIPTORS: SHARP
DESCRIPTORS: DISCOMFORT
DESCRIPTORS: SHARP

## 2017-08-15 NOTE — PROVIDER NOTIFICATION
DATE:  8/14/2017   TIME OF RECEIPT FROM LAB:  1928  LAB TEST:  Lactic  LAB VALUE:  6.2  RESULTS GIVEN WITH READ-BACK TO (PROVIDER):  Dr. Javier  TIME LAB VALUE REPORTED TO PROVIDER:   1928

## 2017-08-15 NOTE — PROGRESS NOTES
[facility]    Hospitalist Progress Note    Date of Service (when I saw the patient): 08/15/2017    Assessment & Plan   Cassandra Camejo is a 32 year old woman who was admitted on 8/13/2017. She carries a history of asthma and tobacco abuse who presents to the emergency department noting right flank pain along with recent symptoms typical for her of asthma exacerbation.The symptoms began approximately a week ago with progressive increase in her flank pain. She has not had prior similar symptoms.  Laparoscopic cholecystectomy approximately a year ago.  She also notes 2-3 days of increasing asthmatic symptoms with cough and sputum productive of yellow      In discussion with Ms. Camejo she states that she began to experience right flank pain about a week ago.  She states that initially the pain was mild, however, has progressed throughout the week.  States that the pain is accompanied by some nausea.  She has not had any fevers that she has known of, however.  There is no report of vomiting or diarrhea.  The pain is localized to the right flank just below the right rib cage and it is wrapping around towards the back.  She denies any previous episodes of pain such as this.  She does have a history of laparoscopic cholecystectomy over 1 year ago. She noted in addition 2-3 days symptoms typical of her asthma.  She has had cough productive of yellow sputum.  Dyspnea and wheezing.  Symptoms were severe enough that she stopped smoking 3 days ago.  No fevers.    1. Acute hypoxemic respiratory failure  Slow improvement, particularly over the past 18 hours of hypoxemic respiratory failure, marked by a decrease in oxygen requirements. no evidence of airways disease on exam or suggestion of exacerbation of asthma, although is below this may have been a process leading to her presentation.  Chest CT today shows scattered areas suggestive of alveolitis.  There is some variability in the degree of lung expansion which  might suggest a bronchiolitis, although again her examination is not highly suggestive of this. No evidence of pulmonary vascular disease. In addition, d-dimer was not elevated.  Echocardiogram does not show evidence of right heart strain.  Overall, the question of venous thromboembolic disease can essentially be eliminated. Low pro-calcitonin is a strong argument against a bacterial lung infection.  We will discontinue antibiotics.  Particularly in the context of a history of asthma, although this appears quite mild, of viral process should be considered.  There is, however, no specific treatment for this.  Depending on her course follow-up evaluation for other noninfectious nonmalignant inflammatory lung processes must be considered. We will continue her current dose of systemic steroids changing to prednisone, given its excellent oral absorption.  She has shown a significant improvement with systemic steroids, beginning approximately 18 hours after their start.  Unfortunately this does not help significantly in the  Differential and airways disease, although admittedly without findings on exam  Or a lung parenchymal inflammatory process should be considered.  2.  Mild intermittent asthma.  She relates an excellent exercise tolerance, being able to walk up to a mile at baseline (although activity limited by leg pain) with intermittent use of bronchodilators usually only several times per month.  Overall meet criteria for mild intermittent asthma.  She has never had pulmonary function testing, which should strongly be considered in another 4-6 weeks.  If for no other reason to stage her degree of airways disease.  As of this is not evident including no findings.  Should she need evaluation with provocation testing perhaps other sources of intermittent lung symptoms should be considered. ND overall imaging and body habitus is more suggestive of a restrictive ventilatory defect.   3.  Flank pain.  Doubt this is  "referrable to any GI process. She notes that this has some pleuritic component and I suspect it is on the basis of musculoskeletal strain because of increased respiratory effort, although she has not had a marked degree of dyspnea or cough.  4.  Possible sleep disordered breathing.  *and a baseline, although this is never been severe enough to greatly disturb her .   Although she has no hypersomnolence  Or other \"high-yield\" symptoms of sleep disordered breathing would be inclined to consider polysomnography in the future.  5.  Tobacco abuse.  Primary history of tobacco abuse.  Discussed with her in detail, usual successful strategies for cessation as well as encouraging her that this is essential.  She does volunteer that in the past.  She has noted worsening breathing symptoms  From smoking that she attributed to a diagnosis of asthma.    DVT Prophylaxis: unfractionated subcutaneous heparin  Code Status: Full Code    Disposition: Expected discharge in depends on her further response. Predicted at least another day and ICU with total hospital course uncertain    Tera Marmolejo    Interval History   Awake and alert.  No dyspnea.  Some improvement in hypoxemia with decreasing oxygen requirements.  No chest discomfort.  Recurrence of right flank pain, which she noted beginning after an episode of cough.    -Data reviewed today: I reviewed all new labs and imaging results over the last 24 hours. I personally reviewedCT of chest.\"  Angiogram\" protocol.  Scattered areas of decreased attenuation consistent with alveolitis.  Some variability in degree of lung expansion.  No evidence of pulmonary vascular disease. Echocardiogram reviewed on computer.  Preserved left ventricular function. Right chamber size and function grossly intact.  No significant pulmonary vascular hypertension.  Formal interpretation pending.    Peripheral IV 08/13/17 Right Upper forearm (Active)   Site Assessment WDL 8/15/2017  1:38 PM "   Line Status Saline locked 8/15/2017  1:38 PM   Phlebitis Scale 0-->no symptoms 8/15/2017  1:38 PM   Infiltration Scale 0 8/15/2017  1:38 PM   Number of days:2       Peripheral IV 08/14/17 Left Lower forearm (Active)   Site Assessment WDL 8/15/2017  1:38 PM   Line Status Saline locked 8/15/2017  1:38 PM   Phlebitis Scale 0-->no symptoms 8/15/2017  1:38 PM   Infiltration Scale 0 8/15/2017  1:38 PM   Number of days:1     Line/device assessment(s) completed for medical necessity August 15    Physical Exam   Temp: 98.2  F (36.8  C) Temp src: Tympanic BP: 133/82   Heart Rate: 92 Resp: 18 SpO2: 92 % O2 Device: None (Room air) Oxygen Delivery: 4 LPM (asleep sats down to 87% titrated to keep up to at least 89%)  Vitals:    08/13/17 2300 08/14/17 0525 08/15/17 0536   Weight: 108.9 kg (240 lb) 117.7 kg (259 lb 7.7 oz) 117.9 kg (259 lb 14.8 oz)     Vital Signs with Ranges  Temp:  [97.5  F (36.4  C)-98.8  F (37.1  C)] 98.2  F (36.8  C)  Heart Rate:  [] 92  Resp:  [13-33] 18  BP: (104-142)/(66-92) 133/82  SpO2:  [87 %-95 %] 92 %  I/O last 3 completed shifts:  In: 1620 [P.O.:1620]  Out: 1225 [Urine:1225]  Awake, alert, mildly fatigued woman lying in bed in ICU.  Speech is clear.  Oriented ×3.  Concentration preserved  HEENT: Pupils equal, conjugate. No icterus or nystagmus. Oral mucosa moist. No facial asymmetry.   Neck: Supple, jugular veins not elevated. Trachea midline   Chest: No chest wall movement asymmetry. Aeration diminished at bases. Accessory muscles not in use. Expiratory time not increased. No tidal wheezes. No wheeze on forced expiratory maneuver. Few basilar. No discrete crackles.   Cardiac: PMI not displaced. S1, S2 unremarkable. No S3, S4. P2 not accentuated. No murmurs. Carotid upstroke preserved.   Abdomen: Soft, obese. No palpation or percussion tenderness. No distention. Normoactive bowel sounds. Liver and spleen not increased in size. No bruits, masses, or pulsations.   Extremities: No lower extremity  edema. Extremities warm distally No eccymoses, clubbing.   Neurologic: Mental state above. Motor 5/5 and bilaterally equal. Tone preserved. No fasiculations or tremors. Sensation intact to light touch. DTR 2/4 and bilaterally equal.     Medications        predniSONE  40 mg Oral Daily     sodium chloride (PF)  3 mL Intracatheter Q8H     albuterol  2 puff Inhalation Q6H     heparin sodium PF  5,000 Units Subcutaneous Q12H     sodium chloride (PF)  3 mL Intravenous Q8H           Data     Recent Labs  Lab 08/15/17  0443 08/14/17  2330 08/13/17  2316   WBC 20.4*  --  17.6*   HGB 12.0  --  13.2   MCV 87  --  87     --  335     --  139   POTASSIUM 4.0  --  3.6   CHLORIDE 107  --  107   CO2 19*  --  24   BUN 8  --  10   CR 0.47*  --  0.68   ANIONGAP 10  --  8   EREN 8.5  --  8.8   * 179* 128*   ALBUMIN 3.1*  --  3.5   PROTTOTAL 7.3  --  8.2   BILITOTAL 0.2  --  0.3   ALKPHOS 51  --  60   ALT 18  --  23   AST 11  --  15   LIPASE  --   --  83       Lactic Acid   Date Value Ref Range Status   08/14/2017 1.7 0.4 - 2.0 mmol/L Final   08/14/2017 6.2 (HH) 0.4 - 2.0 mmol/L Final     Comment:     Critical Value called to and read back by  ANNELYSE STROMBERG ON 8/14/17 AT 1927 BY AUDELIA     08/14/2017 5.1 (HH) 0.4 - 2.0 mmol/L Final     Comment:     Critical Value called to and read back by  NATHANAEL GOMEZ  ON 8/14/17 AT 1546 BY AUDELIA         Recent Results (from the past 24 hour(s))   XR Chest Port 1 View    Narrative    CHEST    REPORT:  There are some linear opacities in both lungs representing  atelectasis or scarring.  Lungs are otherwise clear.  The heart and  pulmonary vessels are within normal limits.    IMPRESSION:  LINEAR ATELECTASIS OR SCARRING SEEN IN BOTH LUNGS.  Exam Date: Aug 14, 2017 02:50:00 PM  Author: LAUREEN RYAN  This report is final and signed     CTA Angiogram Chest w/o & w Contrast    Narrative    CT ANGIOGRAM OF THE CHEST    HISTORY:  This is a 32-year-old female with shortness of  breath.    COMPARISON:  Today's study is compared to a prior chest CT which is  dated September 13, 2016.    TECHNIQUE:  Axial CT imaging of the chest was performed during  arterial phase of contrast administration.  Sagittal and coronal  reconstructions were obtained.    FINDINGS:  There is no evidence of pulmonary embolism.  There is no  evidence of thoracic aortic aneurysm or dissection.  There is no  evidence of mediastinal, hilar or axillary lymphadenopathy.    There is linear airspace opacity and volume loss in both lower lobes.  There is patchy ground-glass airspace opacity in both upper lobes,  right middle lobe and both lower lobes.  Ground-glass airspace opacity  is somewhat subtle, but more extensive than that seen on the prior  study.    IMPRESSION:  1.  NO EVIDENCE OF PULMONARY EMBOLISM.    2.  BILATERAL PATCHY GROUND-GLASS AIRSPACE OPACITIES ARE PRESENT.  DIFFERENTIAL WOULD INCLUDE PULMONARY EDEMA, ATYPICAL PNEUMONIA OR AN  INFLAMMATORY PROCESS.    3.  THERE IS LINEAR ATELECTASIS AT BOTH LUNG BASES.                    SIGNATURE PAGE ONLY  Exam Date: Aug 15, 2017 11:10:00 AM  Author: OSVALDO EVANS  This report is preliminary and transcribed

## 2017-08-15 NOTE — PLAN OF CARE
Problem: Patient Goal: Rt Focus  Goal: 1. Patient Goal: RT Focus  Outcome: No Change  Pt maintaining oxygen saturation >92% on 8L n/c.  Inhaler given overnight.  No nebs given

## 2017-08-15 NOTE — PLAN OF CARE
Problem: Patient Goal: Rt Focus  Goal: 1. Patient Goal: RT Focus  Outcome: Improving  O2 weaned to room air. Scheduled MDI given with spacer. Pt demonstrates good mdi technique

## 2017-08-15 NOTE — PLAN OF CARE
Back from Ct. Instructed patient to drink at least 4 glassess of water today. Verbalized understanding. IV locks patent , flushed easily. VSS. Weaned oxygen over to regular Nasal cannula and turned down to 2 L. Will continue to monitor and wean as able.  here  Up and about in room. Complained of more discomfort on right side. Medicated with 2mg Morphine IV. Offers no further complaints at this time.

## 2017-08-15 NOTE — PLAN OF CARE
Medicated with advil, lortab, cough syrup, and tessalon jorge per request for coughing and right ribcage pain. Up to the bathroom and had a bowel movement. Oxygen left off now. Sat 90-91% on room air. Will continue to monitor. Encouraged ot get up and ambulate in the halls. Patient states she will when her  comes back later.

## 2017-08-15 NOTE — PLAN OF CARE
Face to face report given with opportunity to observe patient.    Report given to Marilyn S., RN Annelyse J. Stromberg   8/15/2017  7:28 AM

## 2017-08-15 NOTE — PHARMACY
[facility]    Pharmacy      Antimicrobial Stewardship Note     Current antimicrobial therapy:  Anti-infectives (Future)    Start     Dose/Rate Route Frequency Ordered Stop    08/15/17 0715  azithromycin (ZITHROMAX) 250 mg in NaCl 0.9 % 250 mL intermittent infusion      250 mg  over 1 Hours Intravenous EVERY 24 HOURS 08/14/17 0702 08/19/17 0714    08/14/17 0715  meropenem (MERREM) 500 mg in NaCl 0.9 % 50 mL intermittent infusion      500 mg  over 30 Minutes Intravenous EVERY 6 HOURS 08/14/17 0702      08/14/17 0000  azithromycin (ZITHROMAX Z-VANDA) 250 MG tablet         08/14/17 0232 08/19/17 2359          Indication: CAP     Pertinent labs:  Creatinine   Creatinine   Date Value Ref Range Status   08/15/2017 0.47 (L) 0.52 - 1.04 mg/dL Final   08/13/2017 0.68 0.52 - 1.04 mg/dL Final   09/17/2016 0.58 0.52 - 1.04 mg/dL Final     WBC   WBC   Date Value Ref Range Status   08/15/2017 20.4 (H) 4.0 - 11.0 10e9/L Final   08/13/2017 17.6 (H) 4.0 - 11.0 10e9/L Final   09/17/2016 15.6 (H) 4.0 - 11.0 10e9/L Final     ANC No components found for: ANC     Culture Results:    Blood culture [[id number]] Collected: 08/14/17 1013       Order Status: Completed Specimen: Blood Updated: 08/15/17 0745        Specimen Description Blood        Special Requests Right Arm        Culture Micro No growth after 21 hours        Micro Report Status Pending       Blood culture [[id number]] Collected: 08/14/17 1023       Order Status: Completed Specimen: Blood Updated: 08/15/17 0745        Specimen Description Blood        Special Requests Right Hand        Culture Micro No growth after 21 hours        Micro Report Status Pending       Sputum Culture Aerobic Bacterial [[id number]] Collected: 08/15/17 0530       Order Status: Completed Specimen: Sputum Updated: 08/15/17 0737        Specimen Description Sputum        Culture Micro Culture in progress        Micro Report Status Pending           Recommendations/Interventions:  1. Recommend  replacing azithromycin/meropenem with levofloxacin/aztreonam, per IDSA/ATS guidelines.    Everette Bearden, [facility]  August 15, 2017

## 2017-08-15 NOTE — PLAN OF CARE
Problem: Goal Outcome Summary  Goal: Goal Outcome Summary  Outcome: Improving  Able to wean patient off oxygen today. Sats are 91% on room air while awake. Has had increasing coughing this afternoon. Medicated with cough medicine and tessalon for cough. Also medicated with lortab and advil for complaints of right sided ribcage pain which increases with movement. Has been up and about in chair today and ambulating in room. Encouraged to go ambulate in halls tonight. Will do when  comes back tonight. Showered this morning without difficulty. IV locks patent and flushed easily. Tolerating a regular diet without difficulty. Had spiral CT of chest which was negative for PE. Encourage to drink a lot of water today. Voiding without difficulty. Offers no complaints.

## 2017-08-15 NOTE — PLAN OF CARE
Problem: Goal Outcome Summary  Goal: Goal Outcome Summary  Outcome: Improving  Pt A&O. VSS. Afebrile. HR 80s-100, sinus rhythm. BP stable. RR 16, non labored at rest, mild KUMAR noted. Pt required increase in oxygen delivery to 8L during night while sleeping, O2 saturations highly variable with pt positioning in bed. Able to wean oxygen now this morning to 5L via high flow nasal cannula. O2 sats >90%. Lung sounds mostly clear bilaterally through night, occasional scattered wheezes improved with albuterol inhaler use. Infrequent cough, productive of small yellow sputum, PRN tessalon jorge given this AM. Pt continues to report right lower rib cage/flank pain this shift, treated with PRN norco, ibuprofen and IV morphine. Pt up independently to commode at bedside, tolerates well. Voiding appropriately. Lactic improved from 6.2 to 1.7. BG 100s. WBC increased to 20.4.

## 2017-08-16 VITALS
BODY MASS INDEX: 43.31 KG/M2 | HEART RATE: 84 BPM | DIASTOLIC BLOOD PRESSURE: 94 MMHG | RESPIRATION RATE: 18 BRPM | TEMPERATURE: 98.2 F | WEIGHT: 259.92 LBS | HEIGHT: 65 IN | SYSTOLIC BLOOD PRESSURE: 141 MMHG | OXYGEN SATURATION: 95 %

## 2017-08-16 LAB
ANION GAP SERPL CALCULATED.3IONS-SCNC: 8 MMOL/L (ref 3–14)
BASOPHILS # BLD AUTO: 0 10E9/L (ref 0–0.2)
BASOPHILS NFR BLD AUTO: 0.2 %
BUN SERPL-MCNC: 15 MG/DL (ref 7–30)
CALCIUM SERPL-MCNC: 7.9 MG/DL (ref 8.5–10.1)
CHLORIDE SERPL-SCNC: 109 MMOL/L (ref 94–109)
CO2 SERPL-SCNC: 23 MMOL/L (ref 20–32)
CREAT SERPL-MCNC: 0.53 MG/DL (ref 0.52–1.04)
DIFFERENTIAL METHOD BLD: ABNORMAL
EOSINOPHIL # BLD AUTO: 0 10E9/L (ref 0–0.7)
EOSINOPHIL NFR BLD AUTO: 0.1 %
ERYTHROCYTE [DISTWIDTH] IN BLOOD BY AUTOMATED COUNT: 13.5 % (ref 10–15)
GFR SERPL CREATININE-BSD FRML MDRD: >90 ML/MIN/1.7M2
GLUCOSE BLDC GLUCOMTR-MCNC: 132 MG/DL (ref 70–99)
GLUCOSE BLDC GLUCOMTR-MCNC: 169 MG/DL (ref 70–99)
GLUCOSE SERPL-MCNC: 126 MG/DL (ref 70–99)
HCT VFR BLD AUTO: 34.4 % (ref 35–47)
HGB BLD-MCNC: 11.8 G/DL (ref 11.7–15.7)
IMM GRANULOCYTES # BLD: 0.2 10E9/L (ref 0–0.4)
IMM GRANULOCYTES NFR BLD: 0.9 %
LYMPHOCYTES # BLD AUTO: 4.1 10E9/L (ref 0.8–5.3)
LYMPHOCYTES NFR BLD AUTO: 20.6 %
MAGNESIUM SERPL-MCNC: 2.3 MG/DL (ref 1.6–2.3)
MCH RBC QN AUTO: 29.6 PG (ref 26.5–33)
MCHC RBC AUTO-ENTMCNC: 34.3 G/DL (ref 31.5–36.5)
MCV RBC AUTO: 86 FL (ref 78–100)
MONOCYTES # BLD AUTO: 1.2 10E9/L (ref 0–1.3)
MONOCYTES NFR BLD AUTO: 6.2 %
NEUTROPHILS # BLD AUTO: 14.4 10E9/L (ref 1.6–8.3)
NEUTROPHILS NFR BLD AUTO: 72 %
NRBC # BLD AUTO: 0 10*3/UL
NRBC BLD AUTO-RTO: 0 /100
PLATELET # BLD AUTO: 332 10E9/L (ref 150–450)
POTASSIUM SERPL-SCNC: 3.9 MMOL/L (ref 3.4–5.3)
RBC # BLD AUTO: 3.99 10E12/L (ref 3.8–5.2)
SODIUM SERPL-SCNC: 140 MMOL/L (ref 133–144)
WBC # BLD AUTO: 20 10E9/L (ref 4–11)

## 2017-08-16 PROCEDURE — 25000132 ZZH RX MED GY IP 250 OP 250 PS 637: Mod: GY | Performed by: INTERNAL MEDICINE

## 2017-08-16 PROCEDURE — 80048 BASIC METABOLIC PNL TOTAL CA: CPT | Performed by: INTERNAL MEDICINE

## 2017-08-16 PROCEDURE — 99239 HOSP IP/OBS DSCHRG MGMT >30: CPT | Performed by: INTERNAL MEDICINE

## 2017-08-16 PROCEDURE — 36415 COLL VENOUS BLD VENIPUNCTURE: CPT | Performed by: INTERNAL MEDICINE

## 2017-08-16 PROCEDURE — 25000128 H RX IP 250 OP 636: Performed by: INTERNAL MEDICINE

## 2017-08-16 PROCEDURE — A9270 NON-COVERED ITEM OR SERVICE: HCPCS | Mod: GY | Performed by: INTERNAL MEDICINE

## 2017-08-16 PROCEDURE — 83735 ASSAY OF MAGNESIUM: CPT | Performed by: INTERNAL MEDICINE

## 2017-08-16 PROCEDURE — 00000146 ZZHCL STATISTIC GLUCOSE BY METER IP

## 2017-08-16 PROCEDURE — 85025 COMPLETE CBC W/AUTO DIFF WBC: CPT | Performed by: INTERNAL MEDICINE

## 2017-08-16 PROCEDURE — 25000125 ZZHC RX 250: Performed by: INTERNAL MEDICINE

## 2017-08-16 RX ORDER — PREDNISONE 10 MG/1
TABLET ORAL
Qty: 70 TABLET | Refills: 0 | Status: ON HOLD | OUTPATIENT
Start: 2017-08-16 | End: 2017-11-13

## 2017-08-16 RX ADMIN — MORPHINE SULFATE 2 MG: 2 INJECTION, SOLUTION INTRAMUSCULAR; INTRAVENOUS at 04:52

## 2017-08-16 RX ADMIN — IBUPROFEN 400 MG: 200 TABLET, FILM COATED ORAL at 04:20

## 2017-08-16 RX ADMIN — PREDNISONE 40 MG: 20 TABLET ORAL at 08:50

## 2017-08-16 RX ADMIN — GUAIFENESIN AND DEXTROMETHORPHAN 5 ML: 100; 10 SYRUP ORAL at 08:50

## 2017-08-16 RX ADMIN — MORPHINE SULFATE 2 MG: 2 INJECTION, SOLUTION INTRAMUSCULAR; INTRAVENOUS at 00:52

## 2017-08-16 RX ADMIN — HYDROCODONE BITARTRATE AND ACETAMINOPHEN 1 TABLET: 5; 325 TABLET ORAL at 04:20

## 2017-08-16 RX ADMIN — GUAIFENESIN AND DEXTROMETHORPHAN 10 ML: 100; 10 SYRUP ORAL at 04:20

## 2017-08-16 RX ADMIN — BENZONATATE 100 MG: 100 CAPSULE, LIQUID FILLED ORAL at 04:20

## 2017-08-16 RX ADMIN — GUAIFENESIN AND DEXTROMETHORPHAN 10 ML: 100; 10 SYRUP ORAL at 00:18

## 2017-08-16 RX ADMIN — HYDROCODONE BITARTRATE AND ACETAMINOPHEN 1 TABLET: 5; 325 TABLET ORAL at 08:50

## 2017-08-16 ASSESSMENT — PAIN DESCRIPTION - DESCRIPTORS
DESCRIPTORS: SHARP

## 2017-08-16 NOTE — PLAN OF CARE
Face to face report given with opportunity to observe patient.    Report given to Tea Magallon   8/15/2017  7:00 PM

## 2017-08-16 NOTE — PLAN OF CARE
Patient discharged at 10:33 AM via ambulation accompanied by spouse and staff. Prescriptions sent to patients preferred pharmacy. Prednisone escribed and called in zpak to [facility]. All belongings sent with patient.     Discharge instructions reviewed with pt. Listed belongings gathered and returned to patient. All personal    Patient discharged to home.   Report called to na    Core Measures and Vaccines  Core Measures applicable during stay: Yes. If yes, state diagnosis: Pneumonia  Pneumonia and Influenza given prior to discharge, if indicated: N/A    Surgical Patient   Surgical Procedures during stay: none  Did patient receive discharge instruction on wound care and recognition of infection symptoms? Yes    MISC  Follow up appointment made:  Yes  Home and hospital aquired medications returned to patient: Yes  Patient reports pain was well managed at discharge: Yes. Down to 4/10.

## 2017-08-16 NOTE — PLAN OF CARE
Problem: Acute Respiratory Distress Syndrome (Adult)  Goal: Signs and Symptoms of Listed Potential Problems Will be Absent or Manageable (Acute Respiratory Distress Syndrome)  Signs and symptoms of listed potential problems will be absent or manageable by discharge/transition of care (reference Acute Respiratory Distress Syndrome (Adult) CPG).   Outcome: Adequate for Discharge Date Met:  08/16/17 08/16/17 0925   Acute Respiratory Distress Syndrome   Problems Assessed (Acute Respiratory Distress Syndrome) all   Problems Present (Acute Respiratory Distress Syndrome) none      Pt lungs diminished with upper EW. Pt refused inh this am. Continues on prednisone. Received norco for right rib cage pain along with robitussin. Pt up independently. Showering before discharge. Removed IV per pt preference. States voiding fine. Cough sounds congested. Tele Sinus dysrhythmia. Pt instructed to not leave floor prior to discharge instructions.

## 2017-08-16 NOTE — PLAN OF CARE
Face to face report given with opportunity to observe patient.    Report given to Nahomy Hoffman   8/16/2017  7:15 AM

## 2017-08-16 NOTE — DISCHARGE SUMMARY
[facility]    Discharge Summary  Hospitalist    Date of Admission:  8/13/2017  Date of Discharge:  8/16/2017 10:33 AM  Discharging Provider: Tera Marmolejo  Date of Service (when I saw the patient): 08/16/17    Discharge Diagnoses   Acute hypoxemic respiratory failure, predominantly resolved.  Established mild intermittent asthma with possible exacerbation.  Atelectasis related to acute thoracic musculoskeletal pain..  Morbid obesity.  Tobacco abuse  Possible sleep-disordered breathing    History of Present Illness   Cassandra Camejo is a 32 year old woman who was admitted on 8/13/2017. She carries a history of asthma and tobacco abuse who presented to the emergency department noting right flank pain along with recent symptoms typical for her of asthma exacerbation.The symptoms began approximately a week ago with progressive increase in her flank pain. Laparoscopic cholecystectomy approximately a year ago.  She noted 2-3 days of increasing asthmatic symptoms with cough  productive of yellow sputum. She began to experience right flank pain about a week ago.  She states that initially the pain was mild, however, has progressed throughout the week.  States that the pain is accompanied by some nausea.  She has not had any fevers that she has known of, however.  No vomiting or diarrhea.      Hospital Course   Cassandra Camejo was admitted on 8/13/2017.  The following problems were addressed during her hospitalization:         1. Acute hypoxemic respiratory failure  Continued improvement with out need for oxygen.  By the morning of discharge.  Accelerating improvement in hypoxemia, particularly over the past 1-2 days. Oxygenation showed improvement beginning approximately 18 hours after beginning systemic steroids.  CT chest imaging shows no evidence of thromboembolic disease.  Scattered areas of decreased attenuation suggestive of alveolitis.  Particularly in the upper lobes.  This involves peripheral  secondary lobules.  Some distended and prominent lymphatics take usually in the lower lobes.   Betaine of lymphatics not prominent.  Cannot eliminate a viral pneumonitis, although the appearance is somewhat atypical.  Would also consider a non-iinfectious lung parenchymal process.  Pro-calcitonin low and bacterial infection, essentially eliminated as a possibility. CT imaging did also show some variability in regional lung expansion which could suggest bronchiolitis, although there are no findings on examination to suggest small airways disease.   Other issues include morbid obesity and lower lobe.  Findings would be consistent with some degree of atelectasis.   For the time being, as below.  Have recommended pulmonary function tests and further evaluation.  Relatively slow taper and systemic steroids as of pulmonary  Parenchymal inflammatory process could be considered.  Imaging not typical for any single entity.  If chest radiograph in 4-6 weeks is not definitively as expected.  Repeat lung imaging without contrast may be considered.  Sarcoidosis would be a consideration.  Not strongly suggestive of hypersensitivity. She continues to smoke until her admission here.  Findings not highly suggestive of tobacco related bronchiolitis. Although prominent lymphatics could demonstrate pulmonary edema, there is no evidence of myocardial dysfunction.  Left ventricular systolic function appears preserved.   No evidence of right sided chamber sizes are function abnormality..  2.  Mild intermittent asthma.  On presentation, she noted symptoms quite consistent with other exacerbations of her asthma. However, even on presentation exams, both on admission and in emergency Department, she had no clear evidence of airways reactivity or dysfunction.  She showed a prominent response to systemic steroids beginning approximately 18 hours after they were started.  However, as above.  This may reflect an parenchymal lung abnormality  "rather than airways disease.  She has not had pulmonary function testing in the past and all her history would be consistent with mild intermittent asthma..  The finding her degree of airflow obstruction or other abnormality would be valuable.  Pulmonary function testing, to be performed in 4-6 weeks, requested at the time of discharge.    She relates an excellent exercise tolerance, being able to walk up to a mile at baseline (although activity limited by leg pain) with intermittent use of bronchodilators usually only several times per month. Dyspnea produced by smoking, seasonal allergies, or irritants in the past.  Overall meets criteria for mild intermittent asthma.  of note body habitus with morbid obesity is suggestive of a restrictive ventilatory abnormality.  A small number of patients.  The Ashley D of \"reverse asthma\" with wheezing because of low lung volumes may be considered.  As above.  Certainly pulmonary function testing overall imaging and body habitus is more suggestive of a restrictive ventilatory defect.   3.  Flank pain.  Likely related to increased respiratory effort with increased musculoskeletal strain. Improved with local heat.  Ibuprofen or other  nonsteroidals might also be beneficial.  Doubt this is referrable to any GI process.   4.  Possible sleep disordered breathing.  Snoring which her  notes has been increasing in severity and frequency over the past number of months.  He also notes this is becoming increasingly disruptive. He does not recall apneas that he has observed, however. Pursue the question of sleep-disordered breathing with sleep lab evaluation. If indicated, would consider polysomnography.  5.  Tobacco abuse.  She indicates commitment to stopping smoking.  Her  who also is a smoker.  Indicates that he has stopped as well.  A side discussed with both usual successful strategies.    Tera Marmolejo    Significant Results and Procedures   CT imaging of the chest " "showing no evidence of thromboembolic disease.  Lung parenchymal decreased attenuation as above, suggesting nonbacterial pneumonitis versus noninfectious inflammatory process.  Echocardiogram with preserved left ventricular function,, absent regional wall motion abnormality,  And no evidence of right chamber size or function abnormality    Pending Results   These results will be followed up by Dr. Dupree  Unresulted Labs Ordered in the Past 30 Days of this Admission     Date and Time Order Name Status Description    8/14/2017 1001 Blood culture Preliminary     8/14/2017 1001 Blood culture Preliminary     8/14/2017 0702 Sputum Culture Aerobic Bacterial Preliminary           Code Status   Full Code       Primary Care Physician   Varinder Burk    Vital signs:  Temp: 98.2  F (36.8  C) Temp src: Tympanic BP: 141/94 Pulse: 84 Heart Rate: 83 Resp: 18 SpO2: 95 % O2 Device: None (Room air) Oxygen Delivery: 1 LPM Height: 165.1 cm (5' 5\") Weight: 117.9 kg (259 lb 14.8 oz)  Estimated body mass index is 43.25 kg/(m^2) as calculated from the following:    Height as of this encounter: 1.651 m (5' 5\").    Weight as of this encounter: 117.9 kg (259 lb 14.8 oz).        Awake, alert, comfortable woman, sitting in bed.  Speech is clear.  Oriented ×3.  HEENT: Pupils equal, conjugate. No icterus or nystagmus. Oral mucosa moist. No facial asymmetry.   Neck: Supple, jugular veins not clearly elevated, although difficult to determine with precision. Trachea midline   Chest: No chest wall movement asymmetry. Aeration preserved the bases. Accessory muscles not in use. Expiratory time not increased. No tidal wheezes. Minimal upper lung field wheeze on forced expiratory maneuver, left greater than right. Few scattered basilar. No discrete crackles.   Cardiac: PMI not displaced. S1, S2 unremarkable. No S3, S4. P2 not accentuated. No murmurs. Carotid upstroke preserved.    Abdomen: Soft, obese. No palpation or percussion tenderness. No " distention. Normoactive bowel sounds. Liver and spleen not increased in size. No bruits, masses, or pulsations.   Extremities: No lower extremity edema. Extremities warm distally.  Brisk capillary refill.No eccymoses, clubbing.   Neurologic: Mental state above. Motor 5/5 and bilaterally equal. Tone preserved. No fasiculations or tremors. DTR 2/4 and bilaterally equal.     Discharge Disposition   Discharged to home  Condition at discharge: Stable    Consultations This Hospital Stay   None    Time Spent on this Encounter   I, Trea Marmolejo, personally saw the patient today and spent greater than 30 minutes discharging this patient.    Discharge Orders     SLEEP EVALUATION & MANAGEMENT REFERRAL - ADULT     Reason for your hospital stay   Cassandra Camejo is a 32 year old woman who was admitted on 8/13/2017. She has a history of asthma and tobacco abuse, noting tright flank pain along with recent symptoms typical for her of asthma exacerbation. Prominent hypoxemia, resolved over several days.   Clinical impression was of a viral pneumonitis or possible noninfectious inflammatory lung disorder.  Other evaluation raised concerns for sleep disordered breathing.  Pulmonary function testing arranged as outpatient as well as sleep study. relatively prolonged taper of systemic steroids prescribed at discharge given possible inflammatory lung disorder.     Follow-up and recommended labs and tests    Follow up with primary care provider, Varinder Burk, within 7-14 days for hospital follow- up.   Pulmonary function testing and sleep study arranged at the time of discharge  Smoking cessation strongly recommended     Activity   Your activity upon discharge: activity as tolerated     Full Code     General PFT Lab (Please always keep checked)     Pulmonary Function Test   ** Bronchodilators/neb treatments should be held 4-6 hours prior to receiving a bronchodilator study.    [facility] will distribute Patient  Information Packet;     [facility] ([facility]) will contact patient by telephone    Please call the following departments if there are questions or need assistance:  [facility]: [phone]  [facility]: [phone]  [facility]: [phone]  [facility] Registered: [phone]  [facility]: [phone]  [facility]: [phone]           Diet   Follow this diet upon discharge: Orders Placed This Encounter     Regular Diet Adult       Discharge Medications   Current Discharge Medication List      START taking these medications    Details   predniSONE (DELTASONE) 10 MG tablet 4 tabs PO daily for 7 days, then 3 tabs PO daily for 7 days, then 2 tabs PO daily for 7 days then 1 tab PO daily for 7 days, then stop  Qty: 70 tablet, Refills: 0    Associated Diagnoses: Idiopathic interstitial pulmonary disease (H)      azithromycin (ZITHROMAX Z-VANDA) 250 MG tablet Two tablets on the first day, then one tablet daily for the next 4 days  Qty: 6 tablet, Refills: 0         CONTINUE these medications which have NOT CHANGED    Details   ipratropium - albuterol 0.5 mg/2.5 mg/3 mL (DUONEB) 0.5-2.5 (3) MG/3ML nebulization Take 1 vial (3 mLs) by nebulization every 4 hours as needed for wheezing  Qty: 360 mL, Refills: 0    Associated Diagnoses: Asthma with acute exacerbation, unspecified asthma severity      order for DME Equipment being ordered: Other: nebulization machine  Treatment Diagnosis: acute asthma exacerbation  Qty: 1 Device, Refills: 0    Associated Diagnoses: Asthma with acute exacerbation, unspecified asthma severity      HYDROcodone-acetaminophen (NORCO) 5-325 MG per tablet Take 1 tablet by mouth 4 times daily as needed for moderate to severe pain       albuterol (PROAIR HFA, PROVENTIL HFA, VENTOLIN HFA) 108 (90 BASE) MCG/ACT inhaler Inhale 2 puffs into the lungs every 6 hours      benzonatate (TESSALON) 100  MG capsule Take 1 capsule (100 mg) by mouth 3 times daily as needed for cough  Qty: 30 capsule, Refills: 0    Associated Diagnoses: Asthma with acute exacerbation, unspecified asthma severity      guaiFENesin-dextromethorphan (ROBITUSSIN DM) 100-10 MG/5ML syrup Take 10 mLs by mouth every 4 hours as needed for cough  Qty: 560 mL    Associated Diagnoses: Asthma with acute exacerbation, unspecified asthma severity      nicotine polacrilex (RA NICOTINE) 2 MG gum Place 1 each (2 mg) inside cheek as needed for smoking cessation  Qty: 30 tablet, Refills: 0    Associated Diagnoses: Tobacco dependence syndrome      IBUPROFEN PO Take 400 mg by mouth every 6 hours as needed for moderate pain           Allergies   Allergies   Allergen Reactions     Cats Shortness Of Breath     Penicillins Rash     Data   Most Recent 3 CBC's:  Recent Labs   Lab Test  08/16/17   0450  08/15/17   0443  08/13/17   2316   WBC  20.0*  20.4*  17.6*   HGB  11.8  12.0  13.2   MCV  86  87  87   PLT  332  278  335      Most Recent 3 BMP's:  Recent Labs   Lab Test  08/16/17   0450  08/15/17   0443  08/14/17   2330  08/13/17   2316   NA  140  136   --   139   POTASSIUM  3.9  4.0   --   3.6   CHLORIDE  109  107   --   107   CO2  23  19*   --   24   BUN  15  8   --   10   CR  0.53  0.47*   --   0.68   ANIONGAP  8  10   --   8   EREN  7.9*  8.5   --   8.8   GLC  126*  177*  179*  128*     Most Recent 2 LFT's:  Recent Labs   Lab Test  08/15/17   0443  08/13/17   2316   AST  11  15   ALT  18  23   ALKPHOS  51  60   BILITOTAL  0.2  0.3     Most Recent INR's and Anticoagulation Dosing History:  Anticoagulation Dose History     There is no flowsheet data to display.        Most Recent 3 Troponin's:No lab results found.  Most Recent Cholesterol Panel:No lab results found.  Most Recent 6 Bacteria Isolates From Any Culture (See EPIC Reports for Culture Details):  Recent Labs   Lab Test  08/15/17   0530  08/14/17   1023  08/14/17   1013  09/13/16   0020  09/12/16   2355   06/27/16   0505   CULT  Culture in progress  No growth after 21 hours  No growth after 21 hours  No growth after 6 days  No beta hemolytic Streptococcus Group A isolated  No growth after 6 days  <10,000 colonies/mL Mixed bacterial guero No further identification or   sensitivity done  *     Most Recent TSH, T4 and A1c Labs:  Recent Labs   Lab Test  09/12/16   2355   A1C  5.9     Results for orders placed or performed during the hospital encounter of 08/13/17   XR Chest 2 Views    Narrative    CHEST TWO VIEWS    COMPARISON:  Today's study is compared to a prior examination which is  dated September 14, 2016.    FINDINGS:  Two views of the chest were obtained.  Heart size and  pulmonary vascularity are within normal limits.  Lungs are clear on  both views.    IMPRESSION:  CLEAR CHEST.  Exam Date: Aug 13, 2017 11:41:40 PM  Author: OSVALDO EVANS  This report is final and signed     CT Abdomen Pelvis w Contrast    Narrative    CT ABDOMEN AND PELVIS    REPORT:  There are patchy areas of ground-glass opacity in both lower  lobes, right worse than left.  No pleural effusion is seen.  There is  fatty liver noted. The patient has undergone cholecystectomy.  The  spleen and pancreas appear normal. There are no adrenal masses.  Right  and left kidneys show no evidence of masses of hydronephrosis.  Periaortic lymph nodes are normal in caliber. In the pelvis the  bladder, uterus, ovaries, and rectum appear normal.  No pelvic masses  or fluid collections are noted.  Degenerative changes are present in  the thoracic and lumbar spine.    IMPRESSION:   BILATERAL LOWER LOBE GROUND-GLASS OPACITY SUSPICIOUS FOR  PNEUMONIA.  Exam Date: Aug 14, 2017 01:29:00 AM  Author: LAUREEN RYAN  This report is final and signed     XR Chest Port 1 View    Narrative    CHEST    REPORT:  There are some linear opacities in both lungs representing  atelectasis or scarring.  Lungs are otherwise clear.  The heart and  pulmonary vessels are within  normal limits.    IMPRESSION:  LINEAR ATELECTASIS OR SCARRING SEEN IN BOTH LUNGS.  Exam Date: Aug 14, 2017 02:50:00 PM  Author: LAUREEN RYAN  This report is final and signed     CTA Angiogram Chest w/o & w Contrast    Narrative    CT ANGIOGRAM OF THE CHEST    HISTORY:  This is a 32-year-old female with shortness of breath.    COMPARISON:  Today's study is compared to a prior chest CT which is  dated September 13, 2016.    TECHNIQUE:  Axial CT imaging of the chest was performed during  arterial phase of contrast administration.  Sagittal and coronal  reconstructions were obtained.    FINDINGS:  There is no evidence of pulmonary embolism.  There is no  evidence of thoracic aortic aneurysm or dissection.  There is no  evidence of mediastinal, hilar or axillary lymphadenopathy.    There is linear airspace opacity and volume loss in both lower lobes.  There is patchy ground-glass airspace opacity in both upper lobes,  right middle lobe and both lower lobes.  Ground-glass airspace opacity  is somewhat subtle, but more extensive than that seen on the prior  study.    IMPRESSION:  1.  NO EVIDENCE OF PULMONARY EMBOLISM.    2.  BILATERAL PATCHY GROUND-GLASS AIRSPACE OPACITIES ARE PRESENT.  DIFFERENTIAL WOULD INCLUDE PULMONARY EDEMA, ATYPICAL PNEUMONIA OR AN  INFLAMMATORY PROCESS.    3.  THERE IS LINEAR ATELECTASIS AT BOTH LUNG BASES.                    SIGNATURE PAGE ONLY  Exam Date: Aug 15, 2017 11:10:00 AM  Author: OSVALDO EVANS  This report is final and signed

## 2017-08-16 NOTE — PROGRESS NOTES
Name: Cassandra Camejo    MRN#: [id number]    Reason for Hospitalization: Abdominal pain, generalized [R10.84]  Acute bronchitis, unspecified organism [J20.9]    HENRY: LOW    Discharge Date: August 16, 2017    Patient / Family response to discharge plan: agreeable    Follow-Up Appt: No future appointments.    Other Providers (Care Coordinator, County Services, PCA services etc): No    Discharge Disposition: home    Met with patient and her . Patient is anxious to get home, her  will provide transportation. She denies any needs or concerns at this time.     Coco Middleton

## 2017-08-16 NOTE — PLAN OF CARE
Dr. Marmolejo updated on patient's urine output throughout the night, will encourage patient to drink more fluids.

## 2017-08-16 NOTE — PLAN OF CARE
Problem: Goal Outcome Summary  Goal: Goal Outcome Summary  Outcome: Improving  Allowed patient to sleep majority of night. Heart rhythm on telemetry SR 50's-90's. Patient started night off on RA but NC was placed on patient throughout night when she slept to keep sat's in the low to mid 90's. Patient currently on 2 L. Denies SOB or KUMAR. Lungs coarse/dim with occasional minimal wheezes. Cough continues, patient given tessalon and robitussin throughout night. Sputum coughed up this morning clear. Patient rated right sided rib cage/back/flank pain 7-10/10 throughout the night, pain was greater during movement and coughing. Medicated with IV Morphine x3, Norco x2, and Ibuprofen once throughout shift. Patient also given hot pack which seemed to help so Aqua-K pad was set up in room for use as well this morning. IV patent, saline flushed throughout night. UP to commode/bathroom independently. Urine output marginal throughout night, will continue to monitor. Uses call light appropriately.

## 2017-08-16 NOTE — PLAN OF CARE
Patient placed on 2 L O2 per NC at HS d/t sat's dropping to 88-91% on RA while sleeping. Throughout the night O2 has been turned up to 3 L to keep sat's >89%. Sat's fluctuating between 90-92% on 3 L. Patient has also been medicated x2 with IV Morphine for pain control of right flank/rib cage.

## 2017-08-17 ENCOUNTER — TELEPHONE (OUTPATIENT)
Dept: CASE MANAGEMENT | Facility: HOSPITAL | Age: 32
End: 2017-08-17

## 2017-08-18 ENCOUNTER — TELEPHONE (OUTPATIENT)
Dept: CASE MANAGEMENT | Facility: HOSPITAL | Age: 32
End: 2017-08-18

## 2017-08-18 LAB
BACTERIA SPEC CULT: NORMAL
SPECIMEN SOURCE: NORMAL

## 2017-08-20 LAB
BACTERIA SPEC CULT: NORMAL
BACTERIA SPEC CULT: NORMAL
Lab: NORMAL
Lab: NORMAL
SPECIMEN SOURCE: NORMAL
SPECIMEN SOURCE: NORMAL

## 2017-11-11 ENCOUNTER — ANESTHESIA EVENT (OUTPATIENT)
Dept: SURGERY | Facility: CLINIC | Age: 32
End: 2017-11-11
Payer: MEDICARE

## 2017-11-13 ENCOUNTER — ANESTHESIA (OUTPATIENT)
Dept: SURGERY | Facility: CLINIC | Age: 32
End: 2017-11-13
Payer: MEDICARE

## 2017-11-13 ENCOUNTER — HOSPITAL ENCOUNTER (OUTPATIENT)
Facility: CLINIC | Age: 32
Discharge: HOME | End: 2017-11-13
Attending: ORTHOPAEDIC SURGERY | Admitting: ORTHOPAEDIC SURGERY
Payer: MEDICARE

## 2017-11-13 ENCOUNTER — APPOINTMENT (OUTPATIENT)
Dept: GENERAL RADIOLOGY | Facility: CLINIC | Age: 32
End: 2017-11-13
Attending: ORTHOPAEDIC SURGERY
Payer: MEDICARE

## 2017-11-13 VITALS
RESPIRATION RATE: 9 BRPM | SYSTOLIC BLOOD PRESSURE: 118 MMHG | HEIGHT: 65 IN | DIASTOLIC BLOOD PRESSURE: 71 MMHG | WEIGHT: 271.8 LBS | TEMPERATURE: 99.1 F | BODY MASS INDEX: 45.29 KG/M2 | OXYGEN SATURATION: 93 %

## 2017-11-13 DIAGNOSIS — Q66.01 CONGENITAL TALIPES EQUINOVARUS DEFORMITY OF RIGHT FOOT: Primary | ICD-10-CM

## 2017-11-13 LAB — HCG UR QL: NEGATIVE

## 2017-11-13 PROCEDURE — C1763 CONN TISS, NON-HUMAN: HCPCS | Performed by: ORTHOPAEDIC SURGERY

## 2017-11-13 PROCEDURE — 36000058 ZZH SURGERY LEVEL 3 EA 15 ADDTL MIN: Performed by: ORTHOPAEDIC SURGERY

## 2017-11-13 PROCEDURE — 27110028 ZZH OR GENERAL SUPPLY NON-STERILE: Performed by: ORTHOPAEDIC SURGERY

## 2017-11-13 PROCEDURE — 27211024 ZZHC OR SUPPLY OTHER OPNP: Performed by: ORTHOPAEDIC SURGERY

## 2017-11-13 PROCEDURE — 25000132 ZZH RX MED GY IP 250 OP 250 PS 637: Mod: GY | Performed by: PHYSICIAN ASSISTANT

## 2017-11-13 PROCEDURE — 40000277 XR SURGERY CARM FLUORO LESS THAN 5 MIN W STILLS

## 2017-11-13 PROCEDURE — 37000009 ZZH ANESTHESIA TECHNICAL FEE, EACH ADDTL 15 MIN: Performed by: ORTHOPAEDIC SURGERY

## 2017-11-13 PROCEDURE — 81025 URINE PREGNANCY TEST: CPT | Performed by: ANESTHESIOLOGY

## 2017-11-13 PROCEDURE — 25000128 H RX IP 250 OP 636: Performed by: NURSE ANESTHETIST, CERTIFIED REGISTERED

## 2017-11-13 PROCEDURE — 71000012 ZZH RECOVERY PHASE 1 LEVEL 1 FIRST HR: Performed by: ORTHOPAEDIC SURGERY

## 2017-11-13 PROCEDURE — 40000170 ZZH STATISTIC PRE-PROCEDURE ASSESSMENT II: Performed by: ORTHOPAEDIC SURGERY

## 2017-11-13 PROCEDURE — 25000132 ZZH RX MED GY IP 250 OP 250 PS 637: Mod: GY | Performed by: ANESTHESIOLOGY

## 2017-11-13 PROCEDURE — 25000128 H RX IP 250 OP 636: Performed by: ANESTHESIOLOGY

## 2017-11-13 PROCEDURE — 25000125 ZZHC RX 250: Performed by: NURSE ANESTHETIST, CERTIFIED REGISTERED

## 2017-11-13 PROCEDURE — 36000060 ZZH SURGERY LEVEL 3 W FLUORO 1ST 30 MIN: Performed by: ORTHOPAEDIC SURGERY

## 2017-11-13 PROCEDURE — 71000013 ZZH RECOVERY PHASE 1 LEVEL 1 EA ADDTL HR: Performed by: ORTHOPAEDIC SURGERY

## 2017-11-13 PROCEDURE — 27210794 ZZH OR GENERAL SUPPLY STERILE: Performed by: ORTHOPAEDIC SURGERY

## 2017-11-13 PROCEDURE — 71000027 ZZH RECOVERY PHASE 2 EACH 15 MINS: Performed by: ORTHOPAEDIC SURGERY

## 2017-11-13 PROCEDURE — 27210995 ZZH RX 272: Performed by: ORTHOPAEDIC SURGERY

## 2017-11-13 PROCEDURE — A9270 NON-COVERED ITEM OR SERVICE: HCPCS | Mod: GY | Performed by: ANESTHESIOLOGY

## 2017-11-13 PROCEDURE — 37000008 ZZH ANESTHESIA TECHNICAL FEE, 1ST 30 MIN: Performed by: ORTHOPAEDIC SURGERY

## 2017-11-13 PROCEDURE — 25000128 H RX IP 250 OP 636: Performed by: ORTHOPAEDIC SURGERY

## 2017-11-13 PROCEDURE — C1762 CONN TISS, HUMAN(INC FASCIA): HCPCS | Performed by: ORTHOPAEDIC SURGERY

## 2017-11-13 PROCEDURE — 25000128 H RX IP 250 OP 636: Performed by: PHYSICIAN ASSISTANT

## 2017-11-13 PROCEDURE — A9270 NON-COVERED ITEM OR SERVICE: HCPCS | Mod: GY | Performed by: PHYSICIAN ASSISTANT

## 2017-11-13 PROCEDURE — C1713 ANCHOR/SCREW BN/BN,TIS/BN: HCPCS | Performed by: ORTHOPAEDIC SURGERY

## 2017-11-13 PROCEDURE — 25800025 ZZH RX 258: Performed by: ORTHOPAEDIC SURGERY

## 2017-11-13 DEVICE — GRAFT BONE CRUSH CANC 15ML 400075: Type: IMPLANTABLE DEVICE | Site: ANKLE | Status: FUNCTIONAL

## 2017-11-13 DEVICE — IMPLANTABLE DEVICE
Type: IMPLANTABLE DEVICE | Site: ANKLE | Status: NON-FUNCTIONAL
Removed: 2018-05-03

## 2017-11-13 RX ORDER — PROPOFOL 10 MG/ML
INJECTION, EMULSION INTRAVENOUS CONTINUOUS PRN
Status: DISCONTINUED | OUTPATIENT
Start: 2017-11-13 | End: 2017-11-13

## 2017-11-13 RX ORDER — KETOROLAC TROMETHAMINE 30 MG/ML
30 INJECTION, SOLUTION INTRAMUSCULAR; INTRAVENOUS ONCE
Status: DISCONTINUED | OUTPATIENT
Start: 2017-11-13 | End: 2017-11-13 | Stop reason: HOSPADM

## 2017-11-13 RX ORDER — FENTANYL CITRATE 50 UG/ML
25-50 INJECTION, SOLUTION INTRAMUSCULAR; INTRAVENOUS EVERY 5 MIN PRN
Status: DISCONTINUED | OUTPATIENT
Start: 2017-11-13 | End: 2017-11-13 | Stop reason: HOSPADM

## 2017-11-13 RX ORDER — PROPOFOL 10 MG/ML
INJECTION, EMULSION INTRAVENOUS PRN
Status: DISCONTINUED | OUTPATIENT
Start: 2017-11-13 | End: 2017-11-13

## 2017-11-13 RX ORDER — HYDROXYZINE HYDROCHLORIDE 25 MG/1
25 TABLET, FILM COATED ORAL EVERY 6 HOURS PRN
Qty: 30 TABLET | Refills: 0 | Status: ON HOLD | OUTPATIENT
Start: 2017-11-13 | End: 2018-05-03

## 2017-11-13 RX ORDER — ONDANSETRON 4 MG/1
4 TABLET, ORALLY DISINTEGRATING ORAL EVERY 30 MIN PRN
Status: DISCONTINUED | OUTPATIENT
Start: 2017-11-13 | End: 2017-11-13 | Stop reason: HOSPADM

## 2017-11-13 RX ORDER — ONDANSETRON 2 MG/ML
4 INJECTION INTRAMUSCULAR; INTRAVENOUS EVERY 30 MIN PRN
Status: DISCONTINUED | OUTPATIENT
Start: 2017-11-13 | End: 2017-11-13 | Stop reason: HOSPADM

## 2017-11-13 RX ORDER — HYDROXYZINE HYDROCHLORIDE 25 MG/1
25 TABLET, FILM COATED ORAL
Status: COMPLETED | OUTPATIENT
Start: 2017-11-13 | End: 2017-11-13

## 2017-11-13 RX ORDER — PHYSOSTIGMINE SALICYLATE 1 MG/ML
1.2 INJECTION INTRAVENOUS
Status: DISCONTINUED | OUTPATIENT
Start: 2017-11-13 | End: 2017-11-13 | Stop reason: HOSPADM

## 2017-11-13 RX ORDER — DEXAMETHASONE SODIUM PHOSPHATE 4 MG/ML
INJECTION, SOLUTION INTRA-ARTICULAR; INTRALESIONAL; INTRAMUSCULAR; INTRAVENOUS; SOFT TISSUE PRN
Status: DISCONTINUED | OUTPATIENT
Start: 2017-11-13 | End: 2017-11-13

## 2017-11-13 RX ORDER — ONDANSETRON 4 MG/1
4 TABLET, ORALLY DISINTEGRATING ORAL
Status: DISCONTINUED | OUTPATIENT
Start: 2017-11-13 | End: 2017-11-13 | Stop reason: HOSPADM

## 2017-11-13 RX ORDER — CEFAZOLIN SODIUM 1 G/3ML
INJECTION, POWDER, FOR SOLUTION INTRAMUSCULAR; INTRAVENOUS PRN
Status: DISCONTINUED | OUTPATIENT
Start: 2017-11-13 | End: 2017-11-13

## 2017-11-13 RX ORDER — NALOXONE HYDROCHLORIDE 0.4 MG/ML
.1-.4 INJECTION, SOLUTION INTRAMUSCULAR; INTRAVENOUS; SUBCUTANEOUS
Status: DISCONTINUED | OUTPATIENT
Start: 2017-11-13 | End: 2017-11-13 | Stop reason: HOSPADM

## 2017-11-13 RX ORDER — MEPERIDINE HYDROCHLORIDE 25 MG/ML
12.5 INJECTION INTRAMUSCULAR; INTRAVENOUS; SUBCUTANEOUS
Status: DISCONTINUED | OUTPATIENT
Start: 2017-11-13 | End: 2017-11-13 | Stop reason: HOSPADM

## 2017-11-13 RX ORDER — OXYCODONE HYDROCHLORIDE 5 MG/1
5-10 TABLET ORAL
Status: COMPLETED | OUTPATIENT
Start: 2017-11-13 | End: 2017-11-13

## 2017-11-13 RX ORDER — SODIUM CHLORIDE, SODIUM LACTATE, POTASSIUM CHLORIDE, CALCIUM CHLORIDE 600; 310; 30; 20 MG/100ML; MG/100ML; MG/100ML; MG/100ML
INJECTION, SOLUTION INTRAVENOUS CONTINUOUS
Status: DISCONTINUED | OUTPATIENT
Start: 2017-11-13 | End: 2017-11-13 | Stop reason: HOSPADM

## 2017-11-13 RX ORDER — FENTANYL CITRATE 50 UG/ML
INJECTION, SOLUTION INTRAMUSCULAR; INTRAVENOUS PRN
Status: DISCONTINUED | OUTPATIENT
Start: 2017-11-13 | End: 2017-11-13

## 2017-11-13 RX ORDER — OXYCODONE HYDROCHLORIDE 5 MG/1
5-15 TABLET ORAL
Qty: 90 TABLET | Refills: 0 | Status: SHIPPED | OUTPATIENT
Start: 2017-11-13 | End: 2017-11-20

## 2017-11-13 RX ORDER — ACETAMINOPHEN 325 MG/1
650 TABLET ORAL EVERY 4 HOURS PRN
Qty: 100 TABLET | Refills: 0 | COMMUNITY
Start: 2017-11-13 | End: 2018-10-29

## 2017-11-13 RX ORDER — AMOXICILLIN 250 MG
1-2 CAPSULE ORAL 2 TIMES DAILY PRN
Qty: 30 TABLET | Refills: 0 | Status: ON HOLD | OUTPATIENT
Start: 2017-11-13 | End: 2018-05-03

## 2017-11-13 RX ORDER — ACETAMINOPHEN 325 MG/1
650 TABLET ORAL
Status: DISCONTINUED | OUTPATIENT
Start: 2017-11-13 | End: 2017-11-13 | Stop reason: HOSPADM

## 2017-11-13 RX ORDER — MAGNESIUM HYDROXIDE 1200 MG/15ML
LIQUID ORAL PRN
Status: DISCONTINUED | OUTPATIENT
Start: 2017-11-13 | End: 2017-11-13 | Stop reason: HOSPADM

## 2017-11-13 RX ORDER — CEFAZOLIN SODIUM 1 G/50ML
3 SOLUTION INTRAVENOUS
Status: COMPLETED | OUTPATIENT
Start: 2017-11-13 | End: 2017-11-13

## 2017-11-13 RX ORDER — HYDROMORPHONE HYDROCHLORIDE 1 MG/ML
.3-.5 INJECTION, SOLUTION INTRAMUSCULAR; INTRAVENOUS; SUBCUTANEOUS EVERY 10 MIN PRN
Status: DISCONTINUED | OUTPATIENT
Start: 2017-11-13 | End: 2017-11-13 | Stop reason: HOSPADM

## 2017-11-13 RX ORDER — GABAPENTIN 300 MG/1
300 CAPSULE ORAL 3 TIMES DAILY
Qty: 9 CAPSULE | Refills: 0 | Status: SHIPPED | OUTPATIENT
Start: 2017-11-13 | End: 2018-10-29

## 2017-11-13 RX ORDER — LIDOCAINE HYDROCHLORIDE 20 MG/ML
INJECTION, SOLUTION INFILTRATION; PERINEURAL PRN
Status: DISCONTINUED | OUTPATIENT
Start: 2017-11-13 | End: 2017-11-13

## 2017-11-13 RX ORDER — CEFAZOLIN SODIUM 2 G/100ML
2 INJECTION, SOLUTION INTRAVENOUS
Status: DISCONTINUED | OUTPATIENT
Start: 2017-11-13 | End: 2017-11-13 | Stop reason: DRUGHIGH

## 2017-11-13 RX ORDER — FENTANYL CITRATE 50 UG/ML
25-50 INJECTION, SOLUTION INTRAMUSCULAR; INTRAVENOUS
Status: DISCONTINUED | OUTPATIENT
Start: 2017-11-13 | End: 2017-11-13 | Stop reason: HOSPADM

## 2017-11-13 RX ORDER — CEFAZOLIN SODIUM 1 G/3ML
1 INJECTION, POWDER, FOR SOLUTION INTRAMUSCULAR; INTRAVENOUS SEE ADMIN INSTRUCTIONS
Status: DISCONTINUED | OUTPATIENT
Start: 2017-11-13 | End: 2017-11-13 | Stop reason: HOSPADM

## 2017-11-13 RX ORDER — BUPIVACAINE HYDROCHLORIDE 2.5 MG/ML
INJECTION, SOLUTION EPIDURAL; INFILTRATION; INTRACAUDAL
Status: DISCONTINUED
Start: 2017-11-13 | End: 2017-11-13 | Stop reason: HOSPADM

## 2017-11-13 RX ORDER — ONDANSETRON 2 MG/ML
INJECTION INTRAMUSCULAR; INTRAVENOUS PRN
Status: DISCONTINUED | OUTPATIENT
Start: 2017-11-13 | End: 2017-11-13

## 2017-11-13 RX ORDER — ALBUTEROL SULFATE 90 UG/1
2 AEROSOL, METERED RESPIRATORY (INHALATION)
Status: COMPLETED | OUTPATIENT
Start: 2017-11-13 | End: 2017-11-13

## 2017-11-13 RX ADMIN — DEXAMETHASONE SODIUM PHOSPHATE 4 MG: 4 INJECTION, SOLUTION INTRA-ARTICULAR; INTRALESIONAL; INTRAMUSCULAR; INTRAVENOUS; SOFT TISSUE at 09:44

## 2017-11-13 RX ADMIN — PROPOFOL 200 MCG/KG/MIN: 10 INJECTION, EMULSION INTRAVENOUS at 09:41

## 2017-11-13 RX ADMIN — FENTANYL CITRATE 50 MCG: 50 INJECTION, SOLUTION INTRAMUSCULAR; INTRAVENOUS at 11:36

## 2017-11-13 RX ADMIN — SODIUM CHLORIDE, POTASSIUM CHLORIDE, SODIUM LACTATE AND CALCIUM CHLORIDE: 600; 310; 30; 20 INJECTION, SOLUTION INTRAVENOUS at 11:00

## 2017-11-13 RX ADMIN — FENTANYL CITRATE 100 MCG: 50 INJECTION, SOLUTION INTRAMUSCULAR; INTRAVENOUS at 09:38

## 2017-11-13 RX ADMIN — MIDAZOLAM HYDROCHLORIDE 2 MG: 1 INJECTION, SOLUTION INTRAMUSCULAR; INTRAVENOUS at 09:38

## 2017-11-13 RX ADMIN — OXYCODONE HYDROCHLORIDE 5 MG: 5 TABLET ORAL at 13:43

## 2017-11-13 RX ADMIN — LIDOCAINE HYDROCHLORIDE 80 MG: 20 INJECTION, SOLUTION INFILTRATION; PERINEURAL at 09:41

## 2017-11-13 RX ADMIN — HYDROXYZINE HYDROCHLORIDE 25 MG: 25 TABLET ORAL at 13:43

## 2017-11-13 RX ADMIN — ONDANSETRON 4 MG: 2 INJECTION INTRAMUSCULAR; INTRAVENOUS at 09:44

## 2017-11-13 RX ADMIN — DEXMEDETOMIDINE HYDROCHLORIDE 8 MCG: 100 INJECTION, SOLUTION INTRAVENOUS at 11:39

## 2017-11-13 RX ADMIN — MIDAZOLAM HYDROCHLORIDE 2 MG: 1 INJECTION, SOLUTION INTRAMUSCULAR; INTRAVENOUS at 08:30

## 2017-11-13 RX ADMIN — FENTANYL CITRATE 50 MCG: 50 INJECTION, SOLUTION INTRAMUSCULAR; INTRAVENOUS at 11:12

## 2017-11-13 RX ADMIN — Medication 3 G: at 09:46

## 2017-11-13 RX ADMIN — PHENYLEPHRINE HYDROCHLORIDE 100 MCG: 10 INJECTION INTRAVENOUS at 10:00

## 2017-11-13 RX ADMIN — SODIUM CHLORIDE, POTASSIUM CHLORIDE, SODIUM LACTATE AND CALCIUM CHLORIDE: 600; 310; 30; 20 INJECTION, SOLUTION INTRAVENOUS at 07:55

## 2017-11-13 RX ADMIN — FENTANYL CITRATE 50 MCG: 50 INJECTION, SOLUTION INTRAMUSCULAR; INTRAVENOUS at 10:27

## 2017-11-13 RX ADMIN — HYDROMORPHONE HYDROCHLORIDE 0.5 MG: 1 INJECTION, SOLUTION INTRAMUSCULAR; INTRAVENOUS; SUBCUTANEOUS at 13:09

## 2017-11-13 RX ADMIN — PROPOFOL 200 MG: 10 INJECTION, EMULSION INTRAVENOUS at 09:41

## 2017-11-13 RX ADMIN — ROPIVACAINE HYDROCHLORIDE 50 ML: 5 INJECTION, SOLUTION EPIDURAL; INFILTRATION; PERINEURAL at 08:30

## 2017-11-13 RX ADMIN — FENTANYL CITRATE 50 MCG: 50 INJECTION INTRAMUSCULAR; INTRAVENOUS at 12:52

## 2017-11-13 RX ADMIN — FENTANYL CITRATE 50 MCG: 50 INJECTION INTRAMUSCULAR; INTRAVENOUS at 12:51

## 2017-11-13 RX ADMIN — PHENYLEPHRINE HYDROCHLORIDE 100 MCG: 10 INJECTION INTRAVENOUS at 09:52

## 2017-11-13 RX ADMIN — CEFAZOLIN 1 G: 1 INJECTION, POWDER, FOR SOLUTION INTRAMUSCULAR; INTRAVENOUS at 11:42

## 2017-11-13 RX ADMIN — ALBUTEROL SULFATE 6 PUFF: 90 AEROSOL, METERED RESPIRATORY (INHALATION) at 12:12

## 2017-11-13 RX ADMIN — FENTANYL CITRATE 50 MCG: 50 INJECTION, SOLUTION INTRAMUSCULAR; INTRAVENOUS at 10:58

## 2017-11-13 ASSESSMENT — LIFESTYLE VARIABLES: TOBACCO_USE: 1

## 2017-11-13 NOTE — ANESTHESIA POSTPROCEDURE EVALUATION
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    Anesthesia Type:  General, LMA, Periph. Nerve Block for postop pain    Note:  Anesthesia Post Evaluation    Last vitals:  Vitals:    11/13/17 1300 11/13/17 1315 11/13/17 1330   BP: (!) 153/95 (!) 158/93 104/60   Resp: 14 8 9   Temp: 36.8  C (98.2  F) 36.7  C (98.1  F) 36.8  C (98.2  F)   SpO2: 97% 99% 99%         Electronically Signed By: Arjun Corbett MD  November 13, 2017  1:43 PM

## 2017-11-13 NOTE — ANESTHESIA PREPROCEDURE EVALUATION
Anesthesia Evaluation     . Pt has had prior anesthetic.     History of anesthetic complications (patient has brother with diagnosed malignant hyperthermia; patient never tested for this; has had multiple previous surgeries without incident)   - PONV        ROS/MED HX    ENT/Pulmonary:     (+)obese, tobacco use (quit 3 months ago), Past use 0.5 PPD packs/day  Mild Persistent asthma Treatment: Inhaler prn,  , recent URI resolved . .   ELLEN Risk Factors: BMI: 38.3.   Neurologic:     (+)migraines,     Cardiovascular:  - neg cardiovascular ROS       METS/Exercise Tolerance:     Hematologic:  - neg hematologic  ROS       Musculoskeletal:   (+) arthritis, , , other musculoskeletal- Club Feet, DJD      GI/Hepatic:     (+) GERD Asymptomatic on medication, cholecystitis/cholelithiasis (S/P cholecystectomy),       Renal/Genitourinary:         Endo:     (+) Obesity (BMI 45), .      Psychiatric:         Infectious Disease:  - neg infectious disease ROS       Malignancy:      - no malignancy   Other:    (+) No chance of pregnancy H/O Chronic Pain,H/O chronic opiod use ,                    Physical Exam  Normal systems: cardiovascular and dental    Airway   Mallampati: III  TM distance: >3 FB  Neck ROM: full    Dental     Cardiovascular   Rhythm and rate: regular and normal      Pulmonary    breath sounds clear to auscultation(+) decreased breath sounds     PE comment: Some faint wheezing; cleared with cough                    Anesthesia Plan      History & Physical Review  History and physical reviewed and following examination; no interval change.    ASA Status:  3 .    NPO Status:  > 8 hours    Plan for General, LMA and Periph. Nerve Block for postop pain with Intravenous and Propofol induction. Maintenance will be TIVA.    PONV prophylaxis:  Ondansetron (or other 5HT-3) and Dexamethasone or Solumedrol  MALIGNANT HYPERTHERMIA PRECAUTIONS      Postoperative Care  Postoperative pain management:  IV analgesics, Oral pain  medications and Peripheral nerve block (Single Shot).      Consents  Anesthetic plan, risks, benefits and alternatives discussed with:  Patient..                          .  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   Current Outpatient Prescriptions on File Prior to Encounter:  HYDROcodone-acetaminophen (NORCO) 5-325 MG per tablet Take 1 tablet by mouth 4 times daily as needed for moderate to severe pain    albuterol (PROAIR HFA, PROVENTIL HFA, VENTOLIN HFA) 108 (90 BASE) MCG/ACT inhaler Inhale 2 puffs into the lungs every 6 hours as needed    ipratropium - albuterol 0.5 mg/2.5 mg/3 mL (DUONEB) 0.5-2.5 (3) MG/3ML nebulization Take 1 vial (3 mLs) by nebulization every 4 hours as needed for wheezing   order for DME Equipment being ordered: Other: nebulization machineTreatment Diagnosis: acute asthma exacerbation     Hemoglobin   Date Value Ref Range Status   08/16/2017 11.8 11.7 - 15.7 g/dL Final     Potassium   Date Value Ref Range Status   08/16/2017 3.9 3.4 - 5.3 mmol/L Final

## 2017-11-13 NOTE — IP AVS SNAPSHOT
MRN:[id number]                      After Visit Summary   11/13/2017    Cassandra Camejo    MRN: [id number]           Thank you!     Thank you for choosing [facility] for your care. Our goal is always to provide you with excellent care. Hearing back from our patients is one way we can continue to improve our services. Please take a few minutes to complete the written survey that you may receive in the mail after you visit with us. Thank you!        Patient Information     Date Of Birth          1985        About your hospital stay     You were admitted on:  November 13, 2017 You last received care in the:  [facility]    You were discharged on:  November 13, 2017       Who to Call     For medical emergencies, please call 911.  For non-urgent questions about your medical care, please call your primary care provider or clinic, [phone]  For questions related to your surgery, please call your surgery clinic        Attending Provider     Provider Specialty    Chito Avalos MD Orthopedics       Primary Care Provider Office Phone # Fax #    Varinder Burk [phone]      After Care Instructions     Diet Instructions       Resume pre-procedure diet            Discharge Instructions       The patient will follow-up in clinic with Dr. Avalos ([facility]; [phone]) in 2 weeks for wound check, suture removal, and NWB XR of the foot and ankle.            Do not - immerse incision in water until sutures removed       Do not immerse incision in water until sutures removed            Dressing       Keep dressing clean and dry.            Ice to affected area       Ice to operative site PRN            No Alcohol       For 24 hours post procedure            No driving or operating machinery        until the day after procedure            No weight bearing                 Further instructions from your care team       Same Day Surgery  Discharge Instructions for  Sedation and General Anesthesia       It's not unusual to feel dizzy, light-headed or faint for up to 24 hours after surgery or while taking pain medication.  If you have these symptoms: sit for a few minutes before standing and have someone assist you when you get up to walk or use the bathroom.      You should rest and relax for the next 24 hours. We recommend you make arrangements to have an adult stay with you for at least 24 hours after your discharge.  Avoid hazardous and strenuous activity.      DO NOT DRIVE any vehicle or operate mechanical equipment for 24 hours following the end of your surgery.  Even though you may feel normal, your reactions may be affected by the medication you have received.      Do not drink alcoholic beverages for 24 hours following surgery.       Slowly progress to your regular diet as you feel able. It's not unusual to feel nauseated and/or vomit after receiving anesthesia.  If you develop these symptoms, drink clear liquids (apple juice, ginger ale, broth, 7-up, etc. ) until you feel better.  If your nausea and vomiting persists for 24 hours, please notify your surgeon.        All narcotic pain medications, along with inactivity and anesthesia, can cause constipation. Drinking plenty of liquids and increasing fiber intake will help.      For any questions of a medical nature, call your surgeon.      Do not make important decisions for 24 hours.      If you had general anesthesia, you may have a sore throat for a couple of days related to the breathing tube used during surgery.  You may use Cepacol lozenges to help with this discomfort.  If it worsens or if you develop a fever, contact your surgeon.       If you feel your pain is not well managed with the pain medications prescribed by your surgeon, please contact your surgeon's office to let them know so they can address your concerns.         [facility]      DISCHARGE INSTRUCTIONS  "FOLLOWING   REGIONAL BLOCK ANESTHESIA      Numbness or lack of feeling in the arm/leg that was operated may last up to 24 hours.  The average time is usually 10-15 hours.  You may not be able to lift or move the arm or leg where the operation was by itself during that time.  Long-acting local anesthetic medicines were used to give you long-lasting pain relief.    Wear a sling until your arm is completely \"awake\"    Avoid bumping your arm, leg or foot while it is numb    Avoid extremes of hot or cold while it is numb    Remain quiet and restful the day of surgery.  Resume normal activities gradually over the next day or so as advise by your surgeon.    Do not drive or operate  Any machinery until your extremity is full  \"awake\"        You will have a tingling and prickly sensation in your arm/leg as the feeling begins to return; you can also expect some discomfort. The amount of discomfort is unpredictable, but if you have more pain that can be controlled with the pain medication you received, you should contact your surgeon.  Start to take your pain pills as soon as you start to feel any discomfort or pain.  We strongly recommend starting your pain medication at bedtime if you haven't already done so.  This is in the event that the block wears off while you are asleep.                              Post-Operative Instruction Sheet for Foot and Ankle Surgery  Chito Avalos M.D.      These precautions MUST be followed for the first 24 hours after surgery:    Upon discharge, go directly home.    You MUST make arrangements for a responsible adult to stay with you the first night following surgery.  Surgery may be cancelled if you do not have someone that can stay with you.    DO NOT DRINK ALCOHOLIC BEVERAGES.    It is not unusual to feel lightheaded up to 24 hours after surgery or while taking pain medications.  If you feel lightheaded, sit up for a few minutes before standing and have someone assist you when you get " up to walk or use the restroom.    Do not use any mechanical equipment or heavy machinery.    Do not make any important or legal decisions for 24 hours or while on pain medication.    You may experience dry mouth, sore throat, or sleep disturbances from the anesthesia and medications used during surgery.  Generalized muscle aches can sometimes occur.  These symptoms generally disappear by 24 hours.    The following are general guidelines and instructions about what to expect the first weeks following surgery.  These are not specific, and your recovery may be slightly different.  Please follow the instructions that are specifically written out for you after the surgery if there are any questions.    Pain Management   If you have received a nerve block, the pain relief can last anywhere from 12-30 hours, this also means you may not have sensation or movement in your foot for that amount of time. You will have pain after the block wears off!  Anticipate this and start pain meds prior to the block wearing off.     Take your first dose of the prescribed pain medication as soon as you get home, even if you have no pain.     Continue to take your medication continuously as prescribed for the first 24-48 hours - ensure that the patient (you) are alert and have no difficulty breathing before taking the medication.  Often it may be helpful to set an alarm throughout the night to ensure you don t miss a dose of the medication and wake up with a lot more pain.    You can expect that the first two nights will be the most painful and uncomfortable. I will give you strong medication to make you as comfortable as possible, but you may still have some break-through pain.  This is not unexpected, as there are many nerve endings in the foot and ankle and many patients state the pain from foot surgery is worse than most other injuries or procedures!    After the first 24-48 hours, you may take the medication as needed for pain.    As  your pain improves, you can gradually decrease your pain meds by substituting Tylenol or an anti-inflammatory medication.  You may also use these medications as directed early in the post-operative process to supplement the narcotic pain medication.    Dressing   Bleeding through the dressings is quite common.    This usually occurs for the first 1-2 hours after surgery. The actual bleeding has stopped by the time you see the drainage through your dressings.    You can reinforce the outside of the dressing with gauze and an Ace wrap unless otherwise directed.  In most cases, your first dressing change will be performed at your first clinic follow-up visit.  In some cases, I may allow you to change your dressings sooner.  Your dressing should be kept DRY at all times - do not shower, bathe, or wet your dressing in any way after surgery!    Wound Care  Once your stitches are removed, you can shower with soapy water and gently cleanse the incision if it is completely dry.     Do not shower or wash the incision(s) if parts of the incision(s) are open or still draining.    Do not soak the incision(s) in a bathtub or hot tub until the incision(s) is completely dry for one week.    Do not soak the incision(s) in lake or ocean water for at least one month post-operatively.    Elevation   I recommend strict elevation of your foot above the level of your heart for 4-5 days.  Elevation of the foot remains important up to two weeks after surgery to limit swelling and help wound healing.    Elevate your foot/ankle to at least your waist level but above your heart would be best. The more you elevate your foot and ankle, the less pain you will have.     In the first few days following surgery, restrict the time your foot is  down  to 10 minutes or less at a time.    It is also good to keep your blood flowing through the operated leg and limit the risk of blood clots.  The first day following surgery, I would encourage you to get  up and move around the house for a few minutes every hour and then return to elevating the foot.    After the strict elevation period (see above) is completed, you may gradually become more active. You should  listen  to your foot/ankle as to when to get off of it and elevate it again.  This may help even months after surgery.  Remember: avoid anything that hurts or makes your foot/ankle swell!    Icing   Icing can be very useful to decrease the pain and swelling of the foot.     Start by first placing a large garbage bag over the dressing.  Ice may then be placed around the extremity by using either bags of ice taped around the extremity   or you may place the extremity in a bucket filled with ice (the dressing MUST be covered with a plastic bag!).  Bags of frozen peas work well!    You should ice for no more than 30 minutes at a time and repeat at 2 hour intervals.     Do NOT place ice directly on your skin or dressing.     Activity     In most cases (unless otherwise instructed), you may not bear weight on your operated foot/ankle for 2 weeks after surgery.  That means the foot may not touch the ground when upright!  Specific weight bearing instructions for your surgery will be provided on the day of surgery.    Your toes may experience bruising after surgery and become darker when the foot hangs down.  Unless you had surgery on those toes, it is important to actively wiggle your toes for 5-10 minutes each hour.    Many of your questions can be addressed at your 2-week follow-up appointment - please make a list of things to ask us as they come up during your recovery.    What to watch for      Severe swelling and/or pain in the calf: this could indicate a deep vein thrombosis (blood clot in leg) which requires urgent evaluation and treatment!    Profuse bleeding: that which soaks through your dressing and increases in size throughout the first day after surgery.    Blue or white toes: this indicates a lack of  blood flow to the foot.     Fever greater than 101.5: fevers less than this are very common the first few days after surgery and are unlikely to indicate infection or any unexpected problem.    Severe pain: that which does not improve after pain medication, except for the first two nights.   If you have any of the above problems or any concerns, please contact my office ([phone]) and further instructions will be provided.  If you are unable to reach anyone or feel you have a medical emergency, please do not hesitate to go to the nearest urgent care or emergency department.    Medications   *Medications may take up to 24 hours to be refilled by my office.*        All pain medications, along with inactivity following surgery, can cause constipation.  Use the stool softeners as recommended, increase fluid intake to at least 1 quart per day, and increase your dietary fiber.  (The  p  fruits - peaches, plums, pears, and prunes - as well as anise/black licorice are generally helpful.)      Antibiotics may be prescribed to limit the risk of infection only in limited circumstances.  Kelfex (cephalexin) 500 mg - This is an antibiotic given in addition to the antibiotic given during surgery to help reduce the chance of post-operative infection.   Dosage: 1 tablet by mouth 4 times a day.  OR   Cleocin (clindamycin) 600 mg - This is an antibiotic given to those patients who are allergic to penicillin in addition to the antibiotic given during surgery to help reduce the chance of post-operative infection.   Dosage: 1 tablet by mouth 3 times a day.      Anti-nausea  Hydroxyzine 25 mg  - This medicine should be taken if you experience any nausea or vomiting. If you know you are sensitive to narcotics please take 1 tablet 30 minutes prior to pain medication. This may also help with any mild itching experienced with the pain medication or muscle spasms.  Dosage: 1 tablet by mouth every 6 hours as needed for nausea, itching,  spasms, or adjuvant pain control.      Pain medications (you will only be given a prescription for ONE of the following!)   Oxycodone 5mg - This is a pain medication that may be taken EVERY 3 to 4 HOURS for pain relief.  You should start the medication when you arrive home after surgery, before the nerve block wears off.  The first 1-2 nights you may need to take up to 2-3 tablets every 3-4 hours. You should be given enough medication to last to the first office visit.  This medication cannot be refilled over the phone!  Dosage: 1-3 tablets every 3 hours as needed for pain relief.  OR   Norco (hydrocodone/acetominophen) 5/325 mg - This is a pain medication that should be taken EVERY 4 TO 6 HOURS for pain relief. You should start the medication when you arrive home after surgery, before the nerve block wears off.  The first 1-2 nights you may need to take 2 tablets every 4 hours.  You should be given enough medication to last to the first office visit.  This medication cannot be refilled over the phone!  Dosage: 1-2 tablets every 4-6 hours as needed for pain relief.  *Do NOT take any Tylenol while taking this medication!  You may alternate Tylenol with this medication provided you do not take greater than 3 grams of Tylenol in total over a 24 hour time period.      Blood thinner  Depending on the type of surgery and your personal risk factors, I may prescribe a medication to help limit the risk of developing a blood clot after your surgery.  The length of time to take the recommended medication will be provided and typically lasts until you are moving about normally or bearing weight on the operated foot/ankle.  ECASA (enteric coated aspirin) 325mg tablet daily.  Lovenox (enoxaparin) 40mg subcutaneous injection daily.  Xarelto 10mg tablet daily.      Stool Softener  Take an over the counter stool softener such as senna or Miralax starting the day after your surgery to prevent constipation. This is a common side  "effect of the narcotic pain medications.  You may stop taking this after you have regular bowel movements or no longer require use of narcotic pain medications.    Dental Implications  Dental procedures should be avoided until your incisions are healed. Furthermore, surgical procedures including hardware or an allograft require taking an antibiotic within one hour of all dental work within 6 months of surgery. Total ankle replacements require indefinite use of antibiotics prior to dental procedures. We would be happy to provide you with the necessary prescription upon request.    Follow-up Visits  You should have your initial post-operative visits already scheduled but if you do not recall the exact dates or do not believe they have been scheduled, please contact [facility] right away to ensure that we have the appropriate visits in the system.    You will likely follow-up with my physician assistant for your first post-operative visit and for a few of the additional follow-up visits.  He works directly with me on all patients and will be able to inform me if there are any concerns during your recovery process!        You can often find additional information about your procedure or condition on the [facility] website at [email or web address] or [email or web address].    Additional information from reputable orthopaedic foot and ankle surgeons affiliated with the American Orth[employer]edic Foot and Ank[employer] Society can be found at [email or web address].footcaremd.com.          Pending Results     No orders found from 11/11/2017 to 11/14/2017.            Admission Information     Date & Time Provider Department Dept. Phone    11/13/2017 Chito Avalos MD [facility] [phone]      Your Vitals Were     Blood Pressure Temperature Respirations Height Weight Last Period    118/71 99.1  F (37.3  C) 9 1.651 m (5' 5\") 123.3 kg (271 lb 12.8 oz) 11/06/2017 (Approximate) " "   Pulse Oximetry BMI (Body Mass Index)                93% 45.23 kg/m2          10X10 Room Information     10X10 Room lets you send messages to your doctor, view your test results, renew your prescriptions, schedule appointments and more. To sign up, go to [email or web address] . Click on \"Log in\" on the left side of the screen, which will take you to the Welcome page. Then click on \"Sign up Now\" on the right side of the page.     You will be asked to enter the access code listed below, as well as some personal information. Please follow the directions to create your username and password.     Your access code is: [email or web address]  Expires: 2017  9:07 AM     Your access code will  in 90 days. If you need help or a new code, please call your [facility] clinic or [phone].        Care EveryWhere ID     This is your Care EveryWhere ID. This could be used by other organizations to access your [facility] medical records  [id number]        Equal Access to Services     MARGARITA CONTRERAS : Hadii yvonne hernandezo Sodeidra, waaxda luqadaha, qaybta kaalmada ademart, selwyn gomez . So [facility] [phone].    ATENCIÓN: Si habla español, tiene a begum disposición servicios gratuitos de asistencia lingüística. Llame al [phone].    We comply with applicable federal civil rights laws and [place] laws. We do not discriminate on the basis of race, color, national origin, age, disability, sex, sexual orientation, or gender identity.               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        Dose:  325 mg   Take " 1 tablet (325 mg) by mouth daily   Quantity:  42 tablet   Refills:  0       gabapentin 300 MG capsule   Commonly known as:  NEURONTIN   Used for:  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        Dose:  300 mg   Take 1 capsule (300 mg) by mouth 3 times daily   Quantity:  9 capsule   Refills:  0       hydrOXYzine 25 MG tablet   Commonly known as:  ATARAX   Used for:  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   Notes to Patient:  Given at 1:45        Dose:  25 mg   Take 1 tablet (25 mg) by mouth every 6 hours as needed for itching (and nausea, adjuvant pain, muscle spasms)   Quantity:  30 tablet   Refills:  0       oxyCODONE IR 5 MG tablet   Commonly known as:  ROXICODONE   Used for:  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   Quantity:  30 tablet   Refills:  0         CONTINUE these medicines which have NOT CHANGED        Dose / Directions    albuterol 108 (90 BASE) MCG/ACT Inhaler   Commonly known as:  PROAIR HFA/PROVENTIL HFA/VENTOLIN HFA        Dose:  2 puff   Inhale 2 puffs into the lungs every 6 hours as needed   Refills:  0       ipratropium - albuterol 0.5 mg/2.5 mg/3 mL 0.5-2.5 (3) MG/3ML neb solution   Commonly known as:  DUONEB   Used for:  Asthma with acute exacerbation, unspecified asthma severity        Dose:  3 mL   Take 1 vial (3 mLs) by nebulization every 4 hours as needed for wheezing   Quantity:  360 mL   Refills:  0       order for DME   Used for:  Asthma with  acute exacerbation, unspecified asthma severity        Equipment being ordered: Other: nebulization machine Treatment Diagnosis: acute asthma exacerbation   Quantity:  1 Device   Refills:  0         STOP taking     HYDROcodone-acetaminophen 5-325 MG per tablet   Commonly known as:  NORCO                Where to get your medicines      These medications were sent to [facility] [place], [place] - [address]  [address], [place] [address]     Phone:  [phone]     aspirin  MG EC tablet    gabapentin 300 MG capsule    hydrOXYzine 25 MG tablet    senna-docusate 8.6-50 MG per tablet         Some of these will need a paper prescription and others can be bought over the counter. Ask your nurse if you have questions.     Bring a paper prescription for each of these medications     oxyCODONE IR 5 MG tablet       You don't need a prescription for these medications     acetaminophen 325 MG tablet                Protect others around you: Learn how to safely use, store and throw away your medicines at www.disposemymeds.org.             Medication List: This is a list of all your medications and when to take them. Check marks below indicate your daily home schedule. Keep this list as a reference.      Medications           Morning Afternoon Evening Bedtime As Needed    acetaminophen 325 MG tablet   Commonly known as:  TYLENOL   Take 2 tablets (650 mg) by mouth every 4 hours as needed for other (mild pain)                                albuterol 108 (90 BASE) MCG/ACT Inhaler   Commonly known as:  PROAIR HFA/PROVENTIL HFA/VENTOLIN HFA   Inhale 2 puffs into the lungs every 6 hours as needed   Last time this was given:  6 puffs on 11/13/2017 12:12 PM                                aspirin  MG EC tablet   Take 1 tablet (325 mg) by mouth daily                                gabapentin 300 MG capsule   Commonly known as:  NEURONTIN   Take 1 capsule (300 mg) by mouth 3 times daily                                 hydrOXYzine 25 MG tablet   Commonly known as:  ATARAX   Take 1 tablet (25 mg) by mouth every 6 hours as needed for itching (and nausea, adjuvant pain, muscle spasms)   Last time this was given:  25 mg on 11/13/2017  1:43 PM   Notes to Patient:  Given at 1:45                                ipratropium - albuterol 0.5 mg/2.5 mg/3 mL 0.5-2.5 (3) MG/3ML neb solution   Commonly known as:  DUONEB   Take 1 vial (3 mLs) by nebulization every 4 hours as needed for wheezing                                order for DME   Equipment being ordered: Other: nebulization machine Treatment Diagnosis: acute asthma exacerbation                                oxyCODONE IR 5 MG tablet   Commonly known as:  ROXICODONE   Take 1-3 tablets (5-15 mg) by mouth every 3 hours as needed for pain or other (Moderate to Severe)   Last time this was given:  5 mg on 11/13/2017  1:43 PM   Notes to Patient:  Given at 1:45                                senna-docusate 8.6-50 MG per tablet   Commonly known as:  SENOKOT-S;PERICOLACE   Take 1-2 tablets by mouth 2 times daily as needed for constipation Take while on oral narcotics to prevent or treat constipation.

## 2017-11-13 NOTE — OR NURSING
Patient rating surgical pain severe, a 10 after 100 mcg fentanyl and dilaudid .5 mg and oxycodone 5 mg and atarax 25mg. Dr Harris here to leanne yoder

## 2017-11-13 NOTE — DISCHARGE INSTRUCTIONS
Same Day Surgery Discharge Instructions for  Sedation and General Anesthesia       It's not unusual to feel dizzy, light-headed or faint for up to 24 hours after surgery or while taking pain medication.  If you have these symptoms: sit for a few minutes before standing and have someone assist you when you get up to walk or use the bathroom.      You should rest and relax for the next 24 hours. We recommend you make arrangements to have an adult stay with you for at least 24 hours after your discharge.  Avoid hazardous and strenuous activity.      DO NOT DRIVE any vehicle or operate mechanical equipment for 24 hours following the end of your surgery.  Even though you may feel normal, your reactions may be affected by the medication you have received.      Do not drink alcoholic beverages for 24 hours following surgery.       Slowly progress to your regular diet as you feel able. It's not unusual to feel nauseated and/or vomit after receiving anesthesia.  If you develop these symptoms, drink clear liquids (apple juice, ginger ale, broth, 7-up, etc. ) until you feel better.  If your nausea and vomiting persists for 24 hours, please notify your surgeon.        All narcotic pain medications, along with inactivity and anesthesia, can cause constipation. Drinking plenty of liquids and increasing fiber intake will help.      For any questions of a medical nature, call your surgeon.      Do not make important decisions for 24 hours.      If you had general anesthesia, you may have a sore throat for a couple of days related to the breathing tube used during surgery.  You may use Cepacol lozenges to help with this discomfort.  If it worsens or if you develop a fever, contact your surgeon.       If you feel your pain is not well managed with the pain medications prescribed by your surgeon, please contact your surgeon's office to let them know so they can address your concerns.         [facility]      DISCHARGE  "INSTRUCTIONS FOLLOWING   REGIONAL BLOCK ANESTHESIA      Numbness or lack of feeling in the arm/leg that was operated may last up to 24 hours.  The average time is usually 10-15 hours.  You may not be able to lift or move the arm or leg where the operation was by itself during that time.  Long-acting local anesthetic medicines were used to give you long-lasting pain relief.    Wear a sling until your arm is completely \"awake\"    Avoid bumping your arm, leg or foot while it is numb    Avoid extremes of hot or cold while it is numb    Remain quiet and restful the day of surgery.  Resume normal activities gradually over the next day or so as advise by your surgeon.    Do not drive or operate  Any machinery until your extremity is full  \"awake\"        You will have a tingling and prickly sensation in your arm/leg as the feeling begins to return; you can also expect some discomfort. The amount of discomfort is unpredictable, but if you have more pain that can be controlled with the pain medication you received, you should contact your surgeon.  Start to take your pain pills as soon as you start to feel any discomfort or pain.  We strongly recommend starting your pain medication at bedtime if you haven't already done so.  This is in the event that the block wears off while you are asleep.                              Post-Operative Instruction Sheet for Foot and Ankle Surgery  Chito Avalos M.D.      These precautions MUST be followed for the first 24 hours after surgery:    Upon discharge, go directly home.    You MUST make arrangements for a responsible adult to stay with you the first night following surgery.  Surgery may be cancelled if you do not have someone that can stay with you.    DO NOT DRINK ALCOHOLIC BEVERAGES.    It is not unusual to feel lightheaded up to 24 hours after surgery or while taking pain medications.  If you feel lightheaded, sit up for a few minutes before standing and have someone assist you " when you get up to walk or use the restroom.    Do not use any mechanical equipment or heavy machinery.    Do not make any important or legal decisions for 24 hours or while on pain medication.    You may experience dry mouth, sore throat, or sleep disturbances from the anesthesia and medications used during surgery.  Generalized muscle aches can sometimes occur.  These symptoms generally disappear by 24 hours.    The following are general guidelines and instructions about what to expect the first weeks following surgery.  These are not specific, and your recovery may be slightly different.  Please follow the instructions that are specifically written out for you after the surgery if there are any questions.    Pain Management   If you have received a nerve block, the pain relief can last anywhere from 12-30 hours, this also means you may not have sensation or movement in your foot for that amount of time. You will have pain after the block wears off!  Anticipate this and start pain meds prior to the block wearing off.     Take your first dose of the prescribed pain medication as soon as you get home, even if you have no pain.     Continue to take your medication continuously as prescribed for the first 24-48 hours - ensure that the patient (you) are alert and have no difficulty breathing before taking the medication.  Often it may be helpful to set an alarm throughout the night to ensure you don t miss a dose of the medication and wake up with a lot more pain.    You can expect that the first two nights will be the most painful and uncomfortable. I will give you strong medication to make you as comfortable as possible, but you may still have some break-through pain.  This is not unexpected, as there are many nerve endings in the foot and ankle and many patients state the pain from foot surgery is worse than most other injuries or procedures!    After the first 24-48 hours, you may take the medication as needed for  pain.    As your pain improves, you can gradually decrease your pain meds by substituting Tylenol or an anti-inflammatory medication.  You may also use these medications as directed early in the post-operative process to supplement the narcotic pain medication.    Dressing   Bleeding through the dressings is quite common.    This usually occurs for the first 1-2 hours after surgery. The actual bleeding has stopped by the time you see the drainage through your dressings.    You can reinforce the outside of the dressing with gauze and an Ace wrap unless otherwise directed.  In most cases, your first dressing change will be performed at your first clinic follow-up visit.  In some cases, I may allow you to change your dressings sooner.  Your dressing should be kept DRY at all times - do not shower, bathe, or wet your dressing in any way after surgery!    Wound Care  Once your stitches are removed, you can shower with soapy water and gently cleanse the incision if it is completely dry.     Do not shower or wash the incision(s) if parts of the incision(s) are open or still draining.    Do not soak the incision(s) in a bathtub or hot tub until the incision(s) is completely dry for one week.    Do not soak the incision(s) in lake or ocean water for at least one month post-operatively.    Elevation   I recommend strict elevation of your foot above the level of your heart for 4-5 days.  Elevation of the foot remains important up to two weeks after surgery to limit swelling and help wound healing.    Elevate your foot/ankle to at least your waist level but above your heart would be best. The more you elevate your foot and ankle, the less pain you will have.     In the first few days following surgery, restrict the time your foot is  down  to 10 minutes or less at a time.    It is also good to keep your blood flowing through the operated leg and limit the risk of blood clots.  The first day following surgery, I would encourage  you to get up and move around the house for a few minutes every hour and then return to elevating the foot.    After the strict elevation period (see above) is completed, you may gradually become more active. You should  listen  to your foot/ankle as to when to get off of it and elevate it again.  This may help even months after surgery.  Remember: avoid anything that hurts or makes your foot/ankle swell!    Icing   Icing can be very useful to decrease the pain and swelling of the foot.     Start by first placing a large garbage bag over the dressing.  Ice may then be placed around the extremity by using either bags of ice taped around the extremity   or you may place the extremity in a bucket filled with ice (the dressing MUST be covered with a plastic bag!).  Bags of frozen peas work well!    You should ice for no more than 30 minutes at a time and repeat at 2 hour intervals.     Do NOT place ice directly on your skin or dressing.     Activity     In most cases (unless otherwise instructed), you may not bear weight on your operated foot/ankle for 2 weeks after surgery.  That means the foot may not touch the ground when upright!  Specific weight bearing instructions for your surgery will be provided on the day of surgery.    Your toes may experience bruising after surgery and become darker when the foot hangs down.  Unless you had surgery on those toes, it is important to actively wiggle your toes for 5-10 minutes each hour.    Many of your questions can be addressed at your 2-week follow-up appointment - please make a list of things to ask us as they come up during your recovery.    What to watch for      Severe swelling and/or pain in the calf: this could indicate a deep vein thrombosis (blood clot in leg) which requires urgent evaluation and treatment!    Profuse bleeding: that which soaks through your dressing and increases in size throughout the first day after surgery.    Blue or white toes: this indicates a  lack of blood flow to the foot.     Fever greater than 101.5: fevers less than this are very common the first few days after surgery and are unlikely to indicate infection or any unexpected problem.    Severe pain: that which does not improve after pain medication, except for the first two nights.   If you have any of the above problems or any concerns, please contact my office ([phone]) and further instructions will be provided.  If you are unable to reach anyone or feel you have a medical emergency, please do not hesitate to go to the nearest urgent care or emergency department.    Medications   *Medications may take up to 24 hours to be refilled by my office.*        All pain medications, along with inactivity following surgery, can cause constipation.  Use the stool softeners as recommended, increase fluid intake to at least 1 quart per day, and increase your dietary fiber.  (The  p  fruits - peaches, plums, pears, and prunes - as well as anise/black licorice are generally helpful.)      Antibiotics may be prescribed to limit the risk of infection only in limited circumstances.  Kelfex (cephalexin) 500 mg - This is an antibiotic given in addition to the antibiotic given during surgery to help reduce the chance of post-operative infection.   Dosage: 1 tablet by mouth 4 times a day.  OR   Cleocin (clindamycin) 600 mg - This is an antibiotic given to those patients who are allergic to penicillin in addition to the antibiotic given during surgery to help reduce the chance of post-operative infection.   Dosage: 1 tablet by mouth 3 times a day.      Anti-nausea  Hydroxyzine 25 mg  - This medicine should be taken if you experience any nausea or vomiting. If you know you are sensitive to narcotics please take 1 tablet 30 minutes prior to pain medication. This may also help with any mild itching experienced with the pain medication or muscle spasms.  Dosage: 1 tablet by mouth every 6 hours as needed for nausea,  itching, spasms, or adjuvant pain control.      Pain medications (you will only be given a prescription for ONE of the following!)   Oxycodone 5mg - This is a pain medication that may be taken EVERY 3 to 4 HOURS for pain relief.  You should start the medication when you arrive home after surgery, before the nerve block wears off.  The first 1-2 nights you may need to take up to 2-3 tablets every 3-4 hours. You should be given enough medication to last to the first office visit.  This medication cannot be refilled over the phone!  Dosage: 1-3 tablets every 3 hours as needed for pain relief.  OR   Norco (hydrocodone/acetominophen) 5/325 mg - This is a pain medication that should be taken EVERY 4 TO 6 HOURS for pain relief. You should start the medication when you arrive home after surgery, before the nerve block wears off.  The first 1-2 nights you may need to take 2 tablets every 4 hours.  You should be given enough medication to last to the first office visit.  This medication cannot be refilled over the phone!  Dosage: 1-2 tablets every 4-6 hours as needed for pain relief.  *Do NOT take any Tylenol while taking this medication!  You may alternate Tylenol with this medication provided you do not take greater than 3 grams of Tylenol in total over a 24 hour time period.      Blood thinner  Depending on the type of surgery and your personal risk factors, I may prescribe a medication to help limit the risk of developing a blood clot after your surgery.  The length of time to take the recommended medication will be provided and typically lasts until you are moving about normally or bearing weight on the operated foot/ankle.  ECASA (enteric coated aspirin) 325mg tablet daily.  Lovenox (enoxaparin) 40mg subcutaneous injection daily.  Xarelto 10mg tablet daily.      Stool Softener  Take an over the counter stool softener such as senna or Miralax starting the day after your surgery to prevent constipation. This is a common  side effect of the narcotic pain medications.  You may stop taking this after you have regular bowel movements or no longer require use of narcotic pain medications.    Dental Implications  Dental procedures should be avoided until your incisions are healed. Furthermore, surgical procedures including hardware or an allograft require taking an antibiotic within one hour of all dental work within 6 months of surgery. Total ankle replacements require indefinite use of antibiotics prior to dental procedures. We would be happy to provide you with the necessary prescription upon request.    Follow-up Visits  You should have your initial post-operative visits already scheduled but if you do not recall the exact dates or do not believe they have been scheduled, please contact Astr[facility] right away to ensure that we have the appropriate visits in the system.    You will likely follow-up with my physician assistant for your first post-operative visit and for a few of the additional follow-up visits.  He works directly with me on all patients and will be able to inform me if there are any concerns during your recovery process!        You can often find additional information about your procedure or condition on the [facility] website at [email or web address] or [email or web address].    Additional information from reputable orthopaedic foot and ankle surgeons affiliated with the [employer] can be found at [email or web address].

## 2017-11-13 NOTE — OP NOTE
PREOPERATIVE DIAGNOSIS:   1.  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  2.  Right ankle impingement.    POSTOPERATIVE DIAGNOSIS:   1.  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  2.  Right ankle impingement.    PROCEDURE(S):   1.  Right medial hindfoot double arthrodesis (subtalar and talonavicular arthrodesis).  2.  Right ankle extensive arthroscopic debridement.    ATTENDING SURGEON: Dr. Chito Avalos.    ASSISTANT SURGEON: Chema Butcher PA-C.    ANESTHESIA: General with regional nerve block.    EBL: 10 mL.    IMPLANTS: [id number] 7.0 mm partially-threaded headless cannulated screws ×2, 5.5 mm partially-threaded headless cannulated screw, 2-hole compression peanut plate with associated 3.5 mm screws; [employer]; crushed cancellous allograft.    COMPLICATIONS: None apparent.    A skilled surgical assistant, Chema Butcher PA-C, was necessary and utilized during the case to assist with patient positioning, prepping and draping, completing the soft tissue/bone work, wound closure, and dressing/splint application.  The assistant was utilized throughout the entire case.    INDICATIONS: Cassandra is a pleasant 32 year-old female who presented to my clinic for evaluation of chronic right ankle and hindfoot pain.  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  The patient has developed increasing discomfort throughout the ankle and hindfoot over the past number of years associated with residual deformity and a flattop talus.  Physical examination and imaging studies were consistent with mild ankle impingement with a possible loose body and hindfoot arthritis and deformity.  Given the patient's failure of extensive conservative treatment, the above operative intervention was offered.  After full discussion of the benefits and risks of surgery, the patient provided informed  consent to proceed.    The patient was identified in the pre-operative holding area on the date of surgery.  The operative site was marked with indelible marker and the patient was brought back to the operating room and transferred to the operating table in a supine position.  All bony prominences were well-padded.  Anesthesia was administered without complication.  The right lower extremity was prepped and draped in standard sterile fashion.  A pre-operative timeout was performed identifying the correct patient, procedure, operative site, antibiotic administration, and equipment necessary for the procedure.    After Esmarch examination, and upper thigh tourniquet was inflated.  Standard anterior ankle arthroscopy portals were established with the ankle in terminal dorsiflexion to protect the underlying chondral surface.  The arthroscope was inserted into the ankle joint and a debrider was inserted into the anterolateral portal.  Moderate synovitis was encountered across the anterior ankle joint and this was carefully debrided.  There was also synovitis within the syndesmosis which was carefully debrided.  A moderate amount of scar tissue was present within the medial and lateral gutters and this was carefully debrided.  There was no evidence of loose body.  There was no significant osteochondral defect appreciated on either side of the joint.  The chondral surfaces appeared pristine without any appreciable degenerative changes.  A small amount of impinging bone at the tibial plafond was carefully debrided.  There was no evidence of further impingement with terminal dorsiflexion of the ankle.  The arthroscopic instruments were removed after removing all loose debris from within the ankle joint.  The arthroscopy portals were reapproximated with 3-0 nylon suture.    The patient's previous medial [place] incision was utilized and soft tissue dissection was carefully carried down maintaining excellent hemostasis.   Extensive scar tissue was carefully dissected to expose the subtalar and talonavicular joints.  Mild degenerative changes were noted across both joints.  Lamina spreaders were placed in the each joint and the remaining chondral tissue on both sides of each joint was debrided with a combination of rongeur and curved osteotome.  The wound was copiously irrigated to remove all loose chondral debris.  A combination of curved osteotome and 2.0 mm drill bit were utilized to fenestrate the subchondral bone on both sides of each joint in preparation for arthrodesis.  [employer] was impacted into the joints along with crushed cancellous allograft to enhance arthrodesis.  While correcting the deformity through the hindfoot joints and significantly translating the navicular plantarly, the joints were temporarily stabilized with K wires, stabilizing the subtalar joint first.  After confirming appropriate alignment of the K wires under fluoroscopic imaging, the subtalar fusion was secured first utilizing [id number] 7.0 mm partially-threaded headless cannulated screws.  The talonavicular joint was secured second utilizing a medial compression plate and a lateral 5.5 mm partially-threaded headless cannulated screw.  Final fluoroscopic images confirmed excellent compression across both hindfoot joints and improved alignment of the hindfoot.  The wound was copiously irrigated.  Deep fascia was reapproximated with 2-0 Vicryl suture.  Subcutaneous tissues and skin were reapproximated with interrupted 3-0 Monocryl and 3-0 nylon sutures respectively.  The screw insertion incisions were reapproximated with 3-0 nylon sutures.  Xeroform and sterile dressings were applied.  The patient was placed in a short leg splint.  The patient was extubated and brought to the PACU in stable condition for further postoperative care.    Postoperatively, the patient will remain strictly nonweightbearing on the right lower extremity.  The  patient will be placed on aspirin for DVT prophylaxis postoperatively.  The patient will return to clinic for follow-up in two weeks for repeat evaluation including wound check, suture removal, and nonweightbearing radiographs of the right foot and ankle.    Of note, all counts were correct at the conclusion of the case.

## 2017-11-13 NOTE — IP AVS SNAPSHOT
[facility]    [address]    [place] [address]    Phone:  [phone]    Fax:  [phone]                                       After Visit Summary   11/13/2017    Cassandra Camejo    MRN: [id number]           After Visit Summary Signature Page     I have received my discharge instructions, and my questions have been answered. I have discussed any challenges I see with this plan with the nurse or doctor.    ..........................................................................................................................................  Patient/Patient Representative Signature      ..........................................................................................................................................  Patient Representative Print Name and Relationship to Patient    ..................................................               ................................................  Date                                            Time    ..........................................................................................................................................  Reviewed by Signature/Title    ...................................................              ..............................................  Date                                                            Time

## 2017-11-13 NOTE — OR NURSING
Pt states her brother has history of malignant hyperthermia, Dr. Harris, OR charge nurse and PACU charge nurse notified.

## 2017-11-13 NOTE — ANESTHESIA PROCEDURE NOTES
Peripheral nerve/Neuraxial procedure note : Popliteal  Pre-Procedure  Performed by JULIO CESAR CASANOVA  Location:       .   Procedure Documentation    .    Procedure:    Popliteal.     .  .             0830 Right popliteal and saphenous blocks for pain relief at surgeon's request.  With the patient in the prone position, after IV started,  pulse oximeter in place and Versed 2mg IV: 22 GA Stimuplex and 25 GA; 50cc (40 + 10) 1/2 % Ropivicaine; 1;200,000 Epi

## 2017-11-13 NOTE — ANESTHESIA CARE TRANSFER NOTE
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    Anesthesia Type:   General, LMA, Periph. Nerve Block for postop pain     Note:  Airway :Face Mask  Patient transferred to:PACU  Comments: Patient spont ventilating. Adequate TV's Opening eyes to voice. LMA removed without issues. To PACU with O2. Vitals stable. Report given to RN.Handoff Report: Identifed the Patient, Identified the Reponsible Provider, Reviewed the pertinent medical history, Discussed the surgical course, Reviewed Intra-OP anesthesia mangement and issues during anesthesia, Set expectations for post-procedure period and Allowed opportunity for questions and acknowledgement of understanding      Vitals: (Last set prior to Anesthesia Care Transfer)    CRNA VITALS  11/13/2017 1151 - 11/13/2017 1226      11/13/2017             Pulse: 101    SpO2: 96 %    Resp Rate (set): 10                Electronically Signed By: JUAN Campa CRNA  November 13, 2017  12:26 PM

## 2017-11-20 ENCOUNTER — HOSPITAL ENCOUNTER (EMERGENCY)
Facility: HOSPITAL | Age: 32
Discharge: HOME | End: 2017-11-20
Attending: PHYSICIAN ASSISTANT | Admitting: PHYSICIAN ASSISTANT
Payer: MEDICARE

## 2017-11-20 VITALS
HEIGHT: 65 IN | SYSTOLIC BLOOD PRESSURE: 158 MMHG | OXYGEN SATURATION: 99 % | WEIGHT: 270 LBS | TEMPERATURE: 98.5 F | RESPIRATION RATE: 22 BRPM | BODY MASS INDEX: 44.98 KG/M2 | DIASTOLIC BLOOD PRESSURE: 85 MMHG

## 2017-11-20 DIAGNOSIS — G89.18 POSTOPERATIVE PAIN: ICD-10-CM

## 2017-11-20 DIAGNOSIS — M25.571 PAIN IN JOINT, ANKLE AND FOOT, RIGHT: ICD-10-CM

## 2017-11-20 PROCEDURE — 99213 OFFICE O/P EST LOW 20 MIN: CPT

## 2017-11-20 PROCEDURE — 99213 OFFICE O/P EST LOW 20 MIN: CPT | Performed by: PHYSICIAN ASSISTANT

## 2017-11-20 RX ORDER — OXYCODONE AND ACETAMINOPHEN 5; 325 MG/1; MG/1
1 TABLET ORAL EVERY 4 HOURS PRN
Qty: 6 TABLET | Refills: 0 | Status: ON HOLD | OUTPATIENT
Start: 2017-11-20 | End: 2018-05-03

## 2017-11-20 ASSESSMENT — ENCOUNTER SYMPTOMS
CARDIOVASCULAR NEGATIVE: 1
PSYCHIATRIC NEGATIVE: 1
JOINT SWELLING: 1
CONSTITUTIONAL NEGATIVE: 1
ARTHRALGIAS: 1

## 2017-11-20 NOTE — ED AVS SNAPSHOT
[facility]    [address] [address]    Phone:  [phone]                                       Cassandra Camejo   MRN: [id number]    Department:  [facility]   Date of Visit:  11/20/2017           After Visit Summary Signature Page     I have received my discharge instructions, and my questions have been answered. I have discussed any challenges I see with this plan with the nurse or doctor.    ..........................................................................................................................................  Patient/Patient Representative Signature      ..........................................................................................................................................  Patient Representative Print Name and Relationship to Patient    ..................................................               ................................................  Date                                            Time    ..........................................................................................................................................  Reviewed by Signature/Title    ...................................................              ..............................................  Date                                                            Time

## 2017-11-20 NOTE — ED NOTES
Patient presents with Rt foot pain after surgery last week.  Patient received 90 oxy last Monday and went thru all of them.  Astrid is nurse of Dr Waddell at [phone]

## 2017-11-20 NOTE — ED AVS SNAPSHOT
[facility]    [address] [address]    Phone:  [phone]                                       Cassandra Camejo   MRN: [id number]    Department:  [facility]   Date of Visit:  11/20/2017           Patient Information     Date Of Birth          1985        Your diagnoses for this visit were:     Postoperative pain     Pain in joint, ankle and foot, right S/O surgical reconstruction       You were seen by Emily Adorno PA.      Follow-up Information     Follow up with Varinder Burk MD In 1 day.    Specialty:  Family Practice    Why:  at 1200 for reevaluation of your Post-Op pain and all further pain medication refills, if needed and not able to get from your Orthopedic Surgeon    Contact information:    [facility]  [address] [facility] [address]  [phone]          Follow up with [facility].    Specialty:  EMERGENCY MEDICINE    Why:  If further concerns develop    Contact information:    [address] [place] [id number] [phone]    Additional information:    From [facility]: Take [address]. Turn left at [address]. Turn left at the first stoplight on [address]. At the first stop sign, take a right onto [address]. Take a left into the parking lot and continue through until you reach the North enterance of the building.       From [place]: Take [address]. Take the [address] ramp towards [place]. Turn left onto [address]. Take a slight right onto [address]. Turn left at the first stoplight on [address]. At the first stop sign, take a right onto [address]. Take a left into the parking lot and continue through until you reach the North enterance of the building.       From [place]: Take [address]. Take a right at [address]. At the first stop sign, take a right onto [address]. Take a left into the parking  lot and continue through until you reach the [facility] of the building.       Discharge References/Attachments     SURGERY, MANAGING PAIN AFTER (ENGLISH)         Review of your medicines      START taking        Dose / Directions Last dose taken    oxyCODONE-acetaminophen 5-325 MG per tablet   Commonly known as:  PERCOCET   Dose:  1 tablet   Quantity:  6 tablet        Take 1 tablet by mouth every 4 hours as needed for moderate to severe pain   Refills:  0          Our records show that you are taking the medicines listed below. If these are incorrect, please call your family doctor or clinic.        Dose / Directions Last dose taken    acetaminophen 325 MG tablet   Commonly known as:  TYLENOL   Dose:  650 mg   Quantity:  100 tablet        Take 2 tablets (650 mg) by mouth every 4 hours as needed for other (mild pain)   Refills:  0        albuterol 108 (90 BASE) MCG/ACT Inhaler   Commonly known as:  PROAIR HFA/PROVENTIL HFA/VENTOLIN HFA   Dose:  2 puff        Inhale 2 puffs into the lungs every 6 hours as needed   Refills:  0        aspirin  MG EC tablet   Dose:  325 mg   Quantity:  42 tablet        Take 1 tablet (325 mg) by mouth daily   Refills:  0        gabapentin 300 MG capsule   Commonly known as:  NEURONTIN   Dose:  300 mg   Quantity:  9 capsule        Take 1 capsule (300 mg) by mouth 3 times daily   Refills:  0        hydrOXYzine 25 MG tablet   Commonly known as:  ATARAX   Dose:  25 mg   Quantity:  30 tablet        Take 1 tablet (25 mg) by mouth every 6 hours as needed for itching (and nausea, adjuvant pain, muscle spasms)   Refills:  0        ipratropium - albuterol 0.5 mg/2.5 mg/3 mL 0.5-2.5 (3) MG/3ML neb solution   Commonly known as:  DUONEB   Dose:  3 mL   Quantity:  360 mL        Take 1 vial (3 mLs) by nebulization every 4 hours as needed for wheezing   Refills:  0        order for DME   Quantity:  1 Device        Equipment being ordered: Other: nebulization machine Treatment Diagnosis: acute  "asthma exacerbation   Refills:  0        senna-docusate 8.6-50 MG per tablet   Commonly known as:  SENOKOT-S;PERICOLACE   Dose:  1-2 tablet   Quantity:  30 tablet        Take 1-2 tablets by mouth 2 times daily as needed for constipation Take while on oral narcotics to prevent or treat constipation.   Refills:  0          STOP taking        Dose Reason for stopping Comments    oxyCODONE IR 5 MG tablet   Commonly known as:  ROXICODONE                      Prescriptions were sent or printed at these locations (1 Prescription)                   [address] [address]    Telephone:  [phone]   Fax:  [phone]   Hours:                  Printed at Department/Unit printer (1 of 1)         oxyCODONE-acetaminophen (PERCOCET) 5-325 MG per tablet                Orders Needing Specimen Collection     None      Pending Results     No orders found from 11/18/2017 to 11/21/2017.            Pending Culture Results     No orders found from 11/18/2017 to 11/21/2017.            Thank you for choosing [facility]       Thank you for choosing [facility] for your care. Our goal is always to provide you with excellent care. Hearing back from our patients is one way we can continue to improve our services. Please take a few minutes to complete the written survey that you may receive in the mail after you visit with us. Thank you!        [employer] Information     [employer] lets you send messages to your doctor, view your test results, renew your prescriptions, schedule appointments and more. To sign up, go to [email or web address] . Click on \"Log in\" on the left side of the screen, which will take you to the Welcome page. Then click on \"Sign up Now\" on the right side of the page.     You will be asked to enter the access code listed below, as well as some personal information. Please follow the directions to create your username and password.     Your access code is: " [email or web address]  Expires: 2018 10:04 AM     Your access code will  in 90 days. If you need help or a new code, please call your [place] clinic or [phone].        Care EveryWhere ID     This is your Care EveryWhere ID. This could be used by other organizations to access your [place] medical records  [id number]        Equal Access to Services     [facility]: Hadii yvonne hernandezo Sodeidra, waaxda luqadaha, qaybta kaalmada adeanaiyada, selwyn gomez . So [facility] [phone].    ATENCIÓN: Si habla español, tiene a begum disposición servicios gratuitos de asistencia lingüística. Llame al [phone].    We comply with applicable federal civil rights laws and [place] laws. We do not discriminate on the basis of race, color, national origin, age, disability, sex, sexual orientation, or gender identity.            After Visit Summary       This is your record. Keep this with you and show to your community pharmacist(s) and doctor(s) at your next visit.

## 2017-11-21 NOTE — ED PROVIDER NOTES
History     Chief Complaint   Patient presents with     Medication Refill     Foot surgery last week at [facility] OA, increased pain, was told to come to ER for pain medications     The history is provided by the patient. No  was used.     Cassandra Camejo is a 32 year old female who has right ankle/foot pain. Pt had left ankle surgery on 2017. Procedure: ARTHRODESIS ANKLE; RIGHT MEDIAL DOUBLE ARTHRODESIS, RIGHT ANKLE ARTHROSCOPIC DEBRIDEMENT ; Surgeon: Chito Avalos MD;     Pt denies any new injury/fall or direct trauma. States she was not able to get into see her PCP and the surgeon is too far away. Requesting refill of pain medication.    Problem List:    Patient Active Problem List    Diagnosis Date Noted     Asthma exacerbation 2017     Priority: Medium     Asthma attack 2017     Priority: Medium     Pneumonia 2017     Priority: Medium     Acute asthma exacerbation 2016     Priority: Medium     Viral syndrome 2016     Priority: Medium     Hyperglycemia 2016     Priority: Medium     Fever, unspecified 2016     Priority: Medium     Acute cholecystitis 2016     Priority: Medium        Past Medical History:    Past Medical History:   Diagnosis Date     Complication of anesthesia      Malignant hyperthermia      Uncomplicated asthma        Past Surgical History:    Past Surgical History:   Procedure Laterality Date     ARTHRODESIS ANKLE Right 2017    Procedure: ARTHRODESIS ANKLE;  RIGHT MEDIAL DOUBLE ARTHRODESIS, RIGHT ANKLE ARTHROSCOPIC DEBRIDEMENT ;  Surgeon: Chito Avalos MD;  Location: [facility]     ARTHROSCOPY ANKLE Right 2017    Procedure: ARTHROSCOPY ANKLE;;  Surgeon: Chito Avalos MD;  Location: [facility]     GYN SURGERY           HEAD & NECK SURGERY      wisdom teeth extraction     LAPAROSCOPIC CHOLECYSTECTOMY N/A 2016    Procedure: LAPAROSCOPIC CHOLECYSTECTOMY;  Surgeon:  "Arjun Harmon MD;  [facility]: [facility]     ORTHOPEDIC SURGERY      carpal tunnel bilateral     ORTHOPEDIC SURGERY      multiple foot surgeries       Family History:    No family history on file.    Social History:  Marital Status:   [2]  Social History   Substance Use Topics     Smoking status: Former Smoker     Packs/day: 0.50     Years: 13.00     Quit date: 8/13/2017     Smokeless tobacco: Never Used     Alcohol use Yes      Comment: very rarely        Medications:      oxyCODONE-acetaminophen (PERCOCET) 5-325 MG per tablet   acetaminophen (TYLENOL) 325 MG tablet   senna-docusate (SENOKOT-S;PERICOLACE) 8.6-50 MG per tablet   hydrOXYzine (ATARAX) 25 MG tablet   gabapentin (NEURONTIN) 300 MG capsule   aspirin  MG EC tablet   ipratropium - albuterol 0.5 mg/2.5 mg/3 mL (DUONEB) 0.5-2.5 (3) MG/3ML nebulization   order for DME   albuterol (PROAIR HFA, PROVENTIL HFA, VENTOLIN HFA) 108 (90 BASE) MCG/ACT inhaler         Review of Systems   Constitutional: Negative.    HENT: Negative.    Cardiovascular: Negative.    Musculoskeletal: Positive for arthralgias, gait problem and joint swelling.   Psychiatric/Behavioral: Negative.        Physical Exam   BP: 158/85  Heart Rate: 90  Temp: 98.5  F (36.9  C)  Resp: 22  Height: 165.1 cm (5' 5\")  Weight: 122.5 kg (270 lb)  SpO2: 99 %      Physical Exam   Constitutional: She is oriented to person, place, and time. She appears well-developed and well-nourished. No distress.   Cardiovascular: Normal rate.    Pulmonary/Chest: Effort normal.   Musculoskeletal:   Right ankle/lower callf area has post surgical cast intact. Proximal to this there is no edema/erythema. No odor noted.    Neurological: She is alert and oriented to person, place, and time.   Skin: She is not diaphoretic.   Psychiatric: She has a normal mood and affect.   Nursing note and vitals reviewed.      ED Course     ED Course     Procedures          Assessments & Plan (with Medical Decision Making)     I have " reviewed the nursing notes.    I have reviewed the findings, diagnosis, plan and need for follow up with the patient.      Discharge Medication List as of 11/20/2017  1:20 PM      START taking these medications    Details   oxyCODONE-acetaminophen (PERCOCET) 5-325 MG per tablet Take 1 tablet by mouth every 4 hours as needed for moderate to severe pain, Disp-6 tablet, R-0, Local Print             Final diagnoses:   Postoperative pain   Pain in joint, ankle and foot, right - S/O surgical reconstruction           Keep elevated. Use your scooter for all ambulation.  Patient verbally educated and given appropriate education sheets for the diagnoses and has no questions.  Take medications as directed.   Follow up with your Primary Care provider tomorrow at 1200 for reevaluation and all further right ankle pain issues, if she is not able to see her surgeon.   if fever/concerns develop, return to the ER  Emily Adorno Certified  Physician Assistant  11/20/2017  10:32 PM  URGENT CARE CLINIC      11/20/2017   [facility]     Emily Adorno PA  11/20/17 4761

## 2018-01-08 ENCOUNTER — TRANSFERRED RECORDS (OUTPATIENT)
Dept: HEALTH INFORMATION MANAGEMENT | Facility: HOSPITAL | Age: 33
End: 2018-01-08

## 2018-03-20 ENCOUNTER — APPOINTMENT (OUTPATIENT)
Dept: GENERAL RADIOLOGY | Facility: HOSPITAL | Age: 33
End: 2018-03-20
Attending: PHYSICIAN ASSISTANT
Payer: MEDICARE

## 2018-03-20 ENCOUNTER — HOSPITAL ENCOUNTER (EMERGENCY)
Facility: HOSPITAL | Age: 33
Discharge: HOME | End: 2018-03-20
Attending: PHYSICIAN ASSISTANT | Admitting: PHYSICIAN ASSISTANT
Payer: MEDICARE

## 2018-03-20 VITALS
WEIGHT: 264 LBS | HEIGHT: 65 IN | OXYGEN SATURATION: 99 % | TEMPERATURE: 97.8 F | RESPIRATION RATE: 18 BRPM | BODY MASS INDEX: 43.99 KG/M2 | SYSTOLIC BLOOD PRESSURE: 156 MMHG | DIASTOLIC BLOOD PRESSURE: 88 MMHG

## 2018-03-20 DIAGNOSIS — Z98.890 S/P FOOT SURGERY, RIGHT: ICD-10-CM

## 2018-03-20 DIAGNOSIS — M25.571 PAIN IN JOINT, ANKLE AND FOOT, RIGHT: ICD-10-CM

## 2018-03-20 PROCEDURE — 73630 X-RAY EXAM OF FOOT: CPT | Mod: TC,RT

## 2018-03-20 PROCEDURE — G0463 HOSPITAL OUTPT CLINIC VISIT: HCPCS | Mod: 25

## 2018-03-20 PROCEDURE — 25000128 H RX IP 250 OP 636: Performed by: PHYSICIAN ASSISTANT

## 2018-03-20 PROCEDURE — 99213 OFFICE O/P EST LOW 20 MIN: CPT | Performed by: PHYSICIAN ASSISTANT

## 2018-03-20 PROCEDURE — A9270 NON-COVERED ITEM OR SERVICE: HCPCS | Mod: GY | Performed by: PHYSICIAN ASSISTANT

## 2018-03-20 PROCEDURE — 73610 X-RAY EXAM OF ANKLE: CPT | Mod: TC,RT

## 2018-03-20 PROCEDURE — 96372 THER/PROPH/DIAG INJ SC/IM: CPT

## 2018-03-20 PROCEDURE — 25000132 ZZH RX MED GY IP 250 OP 250 PS 637: Mod: GY | Performed by: PHYSICIAN ASSISTANT

## 2018-03-20 RX ORDER — OXYCODONE AND ACETAMINOPHEN 5; 325 MG/1; MG/1
1 TABLET ORAL ONCE
Status: COMPLETED | OUTPATIENT
Start: 2018-03-20 | End: 2018-03-20

## 2018-03-20 RX ORDER — KETOROLAC TROMETHAMINE 30 MG/ML
60 INJECTION, SOLUTION INTRAMUSCULAR; INTRAVENOUS ONCE
Status: COMPLETED | OUTPATIENT
Start: 2018-03-20 | End: 2018-03-20

## 2018-03-20 RX ADMIN — OXYCODONE HYDROCHLORIDE AND ACETAMINOPHEN 1 TABLET: 5; 325 TABLET ORAL at 21:08

## 2018-03-20 RX ADMIN — KETOROLAC TROMETHAMINE 60 MG: 30 INJECTION, SOLUTION INTRAMUSCULAR at 21:08

## 2018-03-20 ASSESSMENT — ENCOUNTER SYMPTOMS
PSYCHIATRIC NEGATIVE: 1
CONSTITUTIONAL NEGATIVE: 1
CARDIOVASCULAR NEGATIVE: 1
RESPIRATORY NEGATIVE: 1
JOINT SWELLING: 1
ARTHRALGIAS: 1

## 2018-03-20 NOTE — ED AVS SNAPSHOT
[facility]    [address] [address]    Phone:  [phone]                                       Cassandra Camejo   MRN: [id number]    Department:  [facility]   Date of Visit:  3/20/2018           After Visit Summary Signature Page     I have received my discharge instructions, and my questions have been answered. I have discussed any challenges I see with this plan with the nurse or doctor.    ..........................................................................................................................................  Patient/Patient Representative Signature      ..........................................................................................................................................  Patient Representative Print Name and Relationship to Patient    ..................................................               ................................................  Date                                            Time    ..........................................................................................................................................  Reviewed by Signature/Title    ...................................................              ..............................................  Date                                                            Time

## 2018-03-20 NOTE — ED AVS SNAPSHOT
[facility]    [address]    [place] [address]    Phone:  [phone]                                       Cassandra Camejo   MRN: [id number]    Department:  [facility]   Date of Visit:  3/20/2018           Patient Information     Date Of Birth          1985        Your diagnoses for this visit were:     Pain in joint, ankle and foot, right     S/P foot surgery, right        You were seen by Memo Agrawal PA.      Follow-up Information     Follow up with Varinder Burk MD.    Specialty:  Family Practice    Why:  Friday vs here if poor improvement.     Contact information:    [facility]  [address]  [facility] [address]  [phone]          Discharge Instructions       - rest (avoid weight bearing 1-2 days)  - ice, elevation  - Rotate ibuprofen (600-800mg) and tylenol (500-650) every 3-6 hrs for 2-3 days. Use your lortab every 6 or so hours to stay ahead of pain tomorrow.   * Back here with pain or swelling out of proportion.          Review of your medicines      Our records show that you are taking the medicines listed below. If these are incorrect, please call your family doctor or clinic.        Dose / Directions Last dose taken    acetaminophen 325 MG tablet   Commonly known as:  TYLENOL   Dose:  650 mg   Quantity:  100 tablet        Take 2 tablets (650 mg) by mouth every 4 hours as needed for other (mild pain)   Refills:  0        albuterol 108 (90 BASE) MCG/ACT Inhaler   Commonly known as:  PROAIR HFA/PROVENTIL HFA/VENTOLIN HFA   Dose:  2 puff        Inhale 2 puffs into the lungs every 6 hours as needed   Refills:  0        aspirin  MG EC tablet   Dose:  325 mg   Quantity:  42 tablet        Take 1 tablet (325 mg) by mouth daily   Refills:  0        gabapentin 300 MG capsule   Commonly known as:  NEURONTIN   Dose:  300 mg   Quantity:  9 capsule        Take 1 capsule (300 mg) by mouth 3 times daily   Refills:  0        hydrOXYzine 25 MG  tablet   Commonly known as:  ATARAX   Dose:  25 mg   Quantity:  30 tablet        Take 1 tablet (25 mg) by mouth every 6 hours as needed for itching (and nausea, adjuvant pain, muscle spasms)   Refills:  0        ipratropium - albuterol 0.5 mg/2.5 mg/3 mL 0.5-2.5 (3) MG/3ML neb solution   Commonly known as:  DUONEB   Dose:  3 mL   Quantity:  360 mL        Take 1 vial (3 mLs) by nebulization every 4 hours as needed for wheezing   Refills:  0        order for DME   Quantity:  1 Device        Equipment being ordered: Other: nebulization machine Treatment Diagnosis: acute asthma exacerbation   Refills:  0        oxyCODONE-acetaminophen 5-325 MG per tablet   Commonly known as:  PERCOCET   Dose:  1 tablet   Quantity:  6 tablet        Take 1 tablet by mouth every 4 hours as needed for moderate to severe pain   Refills:  0        senna-docusate 8.6-50 MG per tablet   Commonly known as:  SENOKOT-S;PERICOLACE   Dose:  1-2 tablet   Quantity:  30 tablet        Take 1-2 tablets by mouth 2 times daily as needed for constipation Take while on oral narcotics to prevent or treat constipation.   Refills:  0                Procedures and tests performed during your visit     Ankle XR, G/E 3 views, right    Foot  XR, G/E 3 views, right      Orders Needing Specimen Collection     None      Pending Results     No orders found from 3/18/2018 to 3/21/2018.            Pending Culture Results     No orders found from 3/18/2018 to 3/21/2018.            Thank you for choosing [facility]       Thank you for choosing [facility] for your care. Our goal is always to provide you with excellent care. Hearing back from our patients is one way we can continue to improve our services. Please take a few minutes to complete the written survey that you may receive in the mail after you visit with us. Thank you!        [email or web address] Information     [employer] lets you send messages to your doctor, view your test results, renew your prescriptions, schedule appointments  "and more. To sign up, go to [email or web address] . Click on \"Log in\" on the left side of the screen, which will take you to the Welcome page. Then click on \"Sign up Now\" on the right side of the page.     You will be asked to enter the access code listed below, as well as some personal information. Please follow the directions to create your username and password.     Your access code is: [email or web address]  Expires: 2018  9:00 PM     Your access code will  in 90 days. If you need help or a new code, please call your [place] clinic or [phone].        Care EveryWhere ID     This is your Care EveryWhere ID. This could be used by other organizations to access your [place] medical records  [id number]        Equal Access to Services     MARGARITA CONTRERAS : Jony Benton, julio césar beaver, margy rodriguez, selwyn fitzgerald. So [facility] [phone].    ATENCIÓN: Si habla español, tiene a begum disposición servicios gratuitos de asistencia lingüística. Llame al [phone].    We comply with applicable federal civil rights laws and [place] laws. We do not discriminate on the basis of race, color, national origin, age, disability, sex, sexual orientation, or gender identity.            After Visit Summary       This is your record. Keep this with you and show to your community pharmacist(s) and doctor(s) at your next visit.                  "

## 2018-03-21 NOTE — DISCHARGE INSTRUCTIONS
- rest (avoid weight bearing 1-2 days)  - ice, elevation  - Rotate ibuprofen (600-800mg) and tylenol (500-650) every 3-6 hrs for 2-3 days. Use your lortab every 6 or so hours to stay ahead of pain tomorrow.   * Back here with pain or swelling out of proportion.

## 2018-03-21 NOTE — ED NOTES
Patient presents with pain to LT foot and ankle X last night.  Patient states she slipped last night with increasing pain.

## 2018-03-21 NOTE — ED PROVIDER NOTES
History     Chief Complaint   Patient presents with     Foot Pain     Right foot, slipped last night and lost balance, increased pain since incident     HPI  Cassandra Camejo is a 33 year old female s/p foot/ankle surgery with hx bilateral club foot needing multiple surgeries, who presents with pain and swelling in the right foot after slipping last night. She reports eversion type injury.  She reports that she was doing okay regarding pain until injury last night.  She tried an old Lortab that did not really help with her pain; using her leg-scooter has helped some. She denies any additional injury.    Problem List:    Patient Active Problem List    Diagnosis Date Noted     Asthma exacerbation 2017     Priority: Medium     Asthma attack 2017     Priority: Medium     Pneumonia 2017     Priority: Medium     Acute asthma exacerbation 2016     Priority: Medium     Viral syndrome 2016     Priority: Medium     Hyperglycemia 2016     Priority: Medium     Fever, unspecified 2016     Priority: Medium     Acute cholecystitis 2016     Priority: Medium        Past Medical History:    Past Medical History:   Diagnosis Date     Complication of anesthesia      Malignant hyperthermia      Uncomplicated asthma        Past Surgical History:    Past Surgical History:   Procedure Laterality Date     ARTHRODESIS ANKLE Right 2017    Procedure: ARTHRODESIS ANKLE;  RIGHT MEDIAL DOUBLE ARTHRODESIS, RIGHT ANKLE ARTHROSCOPIC DEBRIDEMENT ;  Surgeon: Chito Avalos MD;  Location: [facility]     ARTHROSCOPY ANKLE Right 2017    Procedure: ARTHROSCOPY ANKLE;;  Surgeon: Chito Avalos MD;  Location: [facility]     GYN SURGERY           HEAD & NECK SURGERY      wisdom teeth extraction     LAPAROSCOPIC CHOLECYSTECTOMY N/A 2016    Procedure: LAPAROSCOPIC CHOLECYSTECTOMY;  Surgeon: Arjun Harmon MD;  Location: [facility]     ORTHOPEDIC SURGERY      carpal tunnel  "bilateral     ORTHOPEDIC SURGERY      multiple foot surgeries       Family History:    No family history on file.    Social History:  Marital Status:   [2]  Social History   Substance Use Topics     Smoking status: Former Smoker     Packs/day: 0.50     Years: 13.00     Quit date: 8/13/2017     Smokeless tobacco: Never Used     Alcohol use Yes      Comment: very rarely        Medications:      oxyCODONE-acetaminophen (PERCOCET) 5-325 MG per tablet   acetaminophen (TYLENOL) 325 MG tablet   senna-docusate (SENOKOT-S;PERICOLACE) 8.6-50 MG per tablet   hydrOXYzine (ATARAX) 25 MG tablet   gabapentin (NEURONTIN) 300 MG capsule   aspirin  MG EC tablet   ipratropium - albuterol 0.5 mg/2.5 mg/3 mL (DUONEB) 0.5-2.5 (3) MG/3ML nebulization   order for DME   albuterol (PROAIR HFA, PROVENTIL HFA, VENTOLIN HFA) 108 (90 BASE) MCG/ACT inhaler         Review of Systems   Constitutional: Negative.    Respiratory: Negative.    Cardiovascular: Negative.    Musculoskeletal: Positive for arthralgias, gait problem and joint swelling.   Psychiatric/Behavioral: Negative.        Physical Exam   BP: 156/88  Heart Rate: 79  Temp: 97.8  F (36.6  C)  Resp: 18  Height: 165.1 cm (5' 5\")  Weight: 119.7 kg (264 lb)  SpO2: 99 %      Physical Exam   Constitutional: She appears well-developed and well-nourished.   Eyes: Conjunctivae are normal.   Cardiovascular: Normal rate.    Pulmonary/Chest: Effort normal.   Musculoskeletal:        Right ankle: She exhibits decreased range of motion and swelling. She exhibits no deformity (congenital). Tenderness.        Feet:    Skin: Skin is warm and dry.   Psychiatric: She has a normal mood and affect.   Nursing note and vitals reviewed.      ED Course     ED Course     Procedures      Results for orders placed or performed during the hospital encounter of 03/20/18 (from the past 24 hour(s))   Ankle XR, G/E 3 views, right    Narrative    Exam: XR FOOT RT G/E 3 VW, XR ANKLE RT G/E 3 VW "     History:Female, age 33 years, slipped;     Comparison:  None    Technique: Three views of the right foot and right ankle are  submitted.    Findings: Bones are moderately osteopenic, and osteoporotic in the mid  foot. Postoperative changes are seen consistent with fusion of the  subtalar joints, and talonavicular joint. There is no distinct  evidence of an acute fracture. No apparent dislocation.  Severe soft  tissue swelling diffusely about the foot and ankle.           Impression    Impression:  1.  No evidence of an acute fracture or dislocation.     2.  Postoperative changes consistent with subtalar and talonavicular  joint fusion with osteopenia/osteoporosis in the mid foot.    3.  Osteopenia and degenerative changes limit evaluation. Comparison  with prior study would be of benefit.    KEITH OJEDA MD   Foot  XR, G/E 3 views, right    Narrative    Exam: XR FOOT RT G/E 3 VW, XR ANKLE RT G/E 3 VW     History:Female, age 33 years, slipped;     Comparison:  None    Technique: Three views of the right foot and right ankle are  submitted.    Findings: Bones are moderately osteopenic, and osteoporotic in the mid  foot. Postoperative changes are seen consistent with fusion of the  subtalar joints, and talonavicular joint. There is no distinct  evidence of an acute fracture. No apparent dislocation.  Severe soft  tissue swelling diffusely about the foot and ankle.           Impression    Impression:  1.  No evidence of an acute fracture or dislocation.     2.  Postoperative changes consistent with subtalar and talonavicular  joint fusion with osteopenia/osteoporosis in the mid foot.    3.  Osteopenia and degenerative changes limit evaluation. Comparison  with prior study would be of benefit.    KEITH OJEDA MD       Medications   oxyCODONE-acetaminophen (PERCOCET) 5-325 MG per tablet 1 tablet (1 tablet Oral Given 3/20/18 2108)   ketorolac (TORADOL) injection 60 mg (60 mg Intramuscular Given 3/20/18 2108)        Assessments & Plan (with Medical Decision Making)     I have reviewed the nursing notes.  I have reviewed the findings, diagnosis, plan and need for follow up with the patient.    Discharge Medication List as of 3/20/2018  9:01 PM          Final diagnoses:   Pain in joint, ankle and foot, right   S/P foot surgery, right   Rapid rad read indicates no acute fracture, hardware appears to be in place. Pt is treated in office for pain, will use lortab she has at home and schedule this as directed for another 24 hrs. Continue to use scooter and avoid weight bearing for 1-2 days. XRs are pushed to [facility] per patient request for review with Dr Avalos/Foot and Ankle. Certainly if things worsen prior to contact with surgeons office she will f/u with PCP vs here. Patient is agreeable to plan and all questions are answered prior to discharge.    PERI BonillaC   3/20/2018   11:23 PM      3/20/2018   [facility]     Memo Agrawal PA  03/20/18 1005

## 2018-04-26 RX ORDER — HYDROCODONE BITARTRATE AND ACETAMINOPHEN 5; 325 MG/1; MG/1
1 TABLET ORAL 4 TIMES DAILY
Status: ON HOLD | COMMUNITY
End: 2018-05-03

## 2018-05-03 ENCOUNTER — APPOINTMENT (OUTPATIENT)
Dept: GENERAL RADIOLOGY | Facility: CLINIC | Age: 33
End: 2018-05-03
Attending: ORTHOPAEDIC SURGERY
Payer: MEDICARE

## 2018-05-03 ENCOUNTER — ANESTHESIA EVENT (OUTPATIENT)
Dept: SURGERY | Facility: CLINIC | Age: 33
End: 2018-05-03
Payer: MEDICARE

## 2018-05-03 ENCOUNTER — ANESTHESIA (OUTPATIENT)
Dept: SURGERY | Facility: CLINIC | Age: 33
End: 2018-05-03
Payer: MEDICARE

## 2018-05-03 ENCOUNTER — HOSPITAL ENCOUNTER (OUTPATIENT)
Facility: CLINIC | Age: 33
Discharge: HOME | End: 2018-05-03
Attending: ORTHOPAEDIC SURGERY | Admitting: ORTHOPAEDIC SURGERY
Payer: MEDICARE

## 2018-05-03 VITALS
TEMPERATURE: 97.5 F | RESPIRATION RATE: 16 BRPM | WEIGHT: 276.9 LBS | HEIGHT: 65 IN | OXYGEN SATURATION: 95 % | DIASTOLIC BLOOD PRESSURE: 84 MMHG | BODY MASS INDEX: 46.13 KG/M2 | SYSTOLIC BLOOD PRESSURE: 125 MMHG

## 2018-05-03 DIAGNOSIS — T84.9XXD FAILURE OF JOINT FUSION, SUBSEQUENT ENCOUNTER: Primary | ICD-10-CM

## 2018-05-03 LAB — HCG UR QL: NEGATIVE

## 2018-05-03 PROCEDURE — 36000060 ZZH SURGERY LEVEL 3 W FLUORO 1ST 30 MIN: Performed by: ORTHOPAEDIC SURGERY

## 2018-05-03 PROCEDURE — 25000125 ZZHC RX 250: Performed by: ANESTHESIOLOGY

## 2018-05-03 PROCEDURE — 25000128 H RX IP 250 OP 636: Performed by: NURSE ANESTHETIST, CERTIFIED REGISTERED

## 2018-05-03 PROCEDURE — C1762 CONN TISS, HUMAN(INC FASCIA): HCPCS | Performed by: ORTHOPAEDIC SURGERY

## 2018-05-03 PROCEDURE — 25000128 H RX IP 250 OP 636: Performed by: ORTHOPAEDIC SURGERY

## 2018-05-03 PROCEDURE — 25000132 ZZH RX MED GY IP 250 OP 250 PS 637: Mod: GY | Performed by: PHYSICIAN ASSISTANT

## 2018-05-03 PROCEDURE — 37000009 ZZH ANESTHESIA TECHNICAL FEE, EACH ADDTL 15 MIN: Performed by: ORTHOPAEDIC SURGERY

## 2018-05-03 PROCEDURE — 71000013 ZZH RECOVERY PHASE 1 LEVEL 1 EA ADDTL HR: Performed by: ORTHOPAEDIC SURGERY

## 2018-05-03 PROCEDURE — 81025 URINE PREGNANCY TEST: CPT | Performed by: ORTHOPAEDIC SURGERY

## 2018-05-03 PROCEDURE — 27210794 ZZH OR GENERAL SUPPLY STERILE: Performed by: ORTHOPAEDIC SURGERY

## 2018-05-03 PROCEDURE — 37000008 ZZH ANESTHESIA TECHNICAL FEE, 1ST 30 MIN: Performed by: ORTHOPAEDIC SURGERY

## 2018-05-03 PROCEDURE — 25000128 H RX IP 250 OP 636: Performed by: ANESTHESIOLOGY

## 2018-05-03 PROCEDURE — 71000027 ZZH RECOVERY PHASE 2 EACH 15 MINS: Performed by: ORTHOPAEDIC SURGERY

## 2018-05-03 PROCEDURE — 27211024 ZZHC OR SUPPLY OTHER OPNP: Performed by: ORTHOPAEDIC SURGERY

## 2018-05-03 PROCEDURE — A9270 NON-COVERED ITEM OR SERVICE: HCPCS | Mod: GY | Performed by: NURSE ANESTHETIST, CERTIFIED REGISTERED

## 2018-05-03 PROCEDURE — 25000125 ZZHC RX 250: Performed by: NURSE ANESTHETIST, CERTIFIED REGISTERED

## 2018-05-03 PROCEDURE — 40000170 ZZH STATISTIC PRE-PROCEDURE ASSESSMENT II: Performed by: ORTHOPAEDIC SURGERY

## 2018-05-03 PROCEDURE — 25000132 ZZH RX MED GY IP 250 OP 250 PS 637: Mod: GY | Performed by: NURSE ANESTHETIST, CERTIFIED REGISTERED

## 2018-05-03 PROCEDURE — A9270 NON-COVERED ITEM OR SERVICE: HCPCS | Mod: GY | Performed by: PHYSICIAN ASSISTANT

## 2018-05-03 PROCEDURE — 40000277 XR SURGERY CARM FLUORO LESS THAN 5 MIN W STILLS

## 2018-05-03 PROCEDURE — 71000012 ZZH RECOVERY PHASE 1 LEVEL 1 FIRST HR: Performed by: ORTHOPAEDIC SURGERY

## 2018-05-03 PROCEDURE — C1713 ANCHOR/SCREW BN/BN,TIS/BN: HCPCS | Performed by: ORTHOPAEDIC SURGERY

## 2018-05-03 PROCEDURE — 36000058 ZZH SURGERY LEVEL 3 EA 15 ADDTL MIN: Performed by: ORTHOPAEDIC SURGERY

## 2018-05-03 PROCEDURE — 27110028 ZZH OR GENERAL SUPPLY NON-STERILE: Performed by: ORTHOPAEDIC SURGERY

## 2018-05-03 DEVICE — GRAFT BONE CRUSH CANC 15ML 400075: Type: IMPLANTABLE DEVICE | Site: ANKLE | Status: FUNCTIONAL

## 2018-05-03 DEVICE — IMPLANTABLE DEVICE: Type: IMPLANTABLE DEVICE | Site: ANKLE | Status: FUNCTIONAL

## 2018-05-03 RX ORDER — ACETAMINOPHEN 325 MG/1
650 TABLET ORAL
Status: DISCONTINUED | OUTPATIENT
Start: 2018-05-03 | End: 2018-05-03 | Stop reason: HOSPADM

## 2018-05-03 RX ORDER — ONDANSETRON 4 MG/1
4 TABLET, ORALLY DISINTEGRATING ORAL
Status: DISCONTINUED | OUTPATIENT
Start: 2018-05-03 | End: 2018-05-03 | Stop reason: HOSPADM

## 2018-05-03 RX ORDER — DEXAMETHASONE SODIUM PHOSPHATE 4 MG/ML
INJECTION, SOLUTION INTRA-ARTICULAR; INTRALESIONAL; INTRAMUSCULAR; INTRAVENOUS; SOFT TISSUE PRN
Status: DISCONTINUED | OUTPATIENT
Start: 2018-05-03 | End: 2018-05-03

## 2018-05-03 RX ORDER — OXYCODONE HYDROCHLORIDE 5 MG/1
5 TABLET ORAL
Status: COMPLETED | OUTPATIENT
Start: 2018-05-03 | End: 2018-05-03

## 2018-05-03 RX ORDER — SODIUM CHLORIDE, SODIUM LACTATE, POTASSIUM CHLORIDE, CALCIUM CHLORIDE 600; 310; 30; 20 MG/100ML; MG/100ML; MG/100ML; MG/100ML
INJECTION, SOLUTION INTRAVENOUS CONTINUOUS
Status: DISCONTINUED | OUTPATIENT
Start: 2018-05-03 | End: 2018-05-03 | Stop reason: HOSPADM

## 2018-05-03 RX ORDER — OXYCODONE HYDROCHLORIDE 5 MG/1
5-10 TABLET ORAL
Qty: 40 TABLET | Refills: 0 | Status: SHIPPED | OUTPATIENT
Start: 2018-05-03 | End: 2018-10-29

## 2018-05-03 RX ORDER — CEFAZOLIN SODIUM 1 G/50ML
3 SOLUTION INTRAVENOUS
Status: COMPLETED | OUTPATIENT
Start: 2018-05-03 | End: 2018-05-03

## 2018-05-03 RX ORDER — PROPOFOL 10 MG/ML
INJECTION, EMULSION INTRAVENOUS PRN
Status: DISCONTINUED | OUTPATIENT
Start: 2018-05-03 | End: 2018-05-03

## 2018-05-03 RX ORDER — AMOXICILLIN 250 MG
1-2 CAPSULE ORAL 2 TIMES DAILY
Qty: 30 TABLET | Refills: 0 | Status: SHIPPED | OUTPATIENT
Start: 2018-05-03 | End: 2018-10-29

## 2018-05-03 RX ORDER — CEFAZOLIN SODIUM 1 G/3ML
1 INJECTION, POWDER, FOR SOLUTION INTRAMUSCULAR; INTRAVENOUS SEE ADMIN INSTRUCTIONS
Status: DISCONTINUED | OUTPATIENT
Start: 2018-05-03 | End: 2018-05-03 | Stop reason: HOSPADM

## 2018-05-03 RX ORDER — BUPIVACAINE HYDROCHLORIDE 5 MG/ML
INJECTION, SOLUTION EPIDURAL; INTRACAUDAL
Status: DISCONTINUED
Start: 2018-05-03 | End: 2018-05-03 | Stop reason: HOSPADM

## 2018-05-03 RX ORDER — HYDROXYZINE HYDROCHLORIDE 25 MG/1
25 TABLET, FILM COATED ORAL EVERY 6 HOURS PRN
Qty: 30 TABLET | Refills: 0 | Status: SHIPPED | OUTPATIENT
Start: 2018-05-03

## 2018-05-03 RX ORDER — ONDANSETRON 2 MG/ML
INJECTION INTRAMUSCULAR; INTRAVENOUS PRN
Status: DISCONTINUED | OUTPATIENT
Start: 2018-05-03 | End: 2018-05-03

## 2018-05-03 RX ORDER — MEPERIDINE HYDROCHLORIDE 25 MG/ML
12.5 INJECTION INTRAMUSCULAR; INTRAVENOUS; SUBCUTANEOUS
Status: DISCONTINUED | OUTPATIENT
Start: 2018-05-03 | End: 2018-05-03 | Stop reason: HOSPADM

## 2018-05-03 RX ORDER — LIDOCAINE HYDROCHLORIDE 20 MG/ML
INJECTION, SOLUTION INFILTRATION; PERINEURAL PRN
Status: DISCONTINUED | OUTPATIENT
Start: 2018-05-03 | End: 2018-05-03

## 2018-05-03 RX ORDER — FENTANYL CITRATE 50 UG/ML
INJECTION, SOLUTION INTRAMUSCULAR; INTRAVENOUS PRN
Status: DISCONTINUED | OUTPATIENT
Start: 2018-05-03 | End: 2018-05-03

## 2018-05-03 RX ORDER — LIDOCAINE HYDROCHLORIDE 10 MG/ML
INJECTION, SOLUTION EPIDURAL; INFILTRATION; INTRACAUDAL; PERINEURAL
Status: DISCONTINUED
Start: 2018-05-03 | End: 2018-05-03 | Stop reason: HOSPADM

## 2018-05-03 RX ORDER — PROPOFOL 10 MG/ML
INJECTION, EMULSION INTRAVENOUS CONTINUOUS PRN
Status: DISCONTINUED | OUTPATIENT
Start: 2018-05-03 | End: 2018-05-03

## 2018-05-03 RX ORDER — ONDANSETRON 4 MG/1
4 TABLET, ORALLY DISINTEGRATING ORAL EVERY 30 MIN PRN
Status: DISCONTINUED | OUTPATIENT
Start: 2018-05-03 | End: 2018-05-03 | Stop reason: HOSPADM

## 2018-05-03 RX ORDER — ALBUTEROL SULFATE 0.83 MG/ML
2.5 SOLUTION RESPIRATORY (INHALATION) EVERY 6 HOURS PRN
Status: DISCONTINUED | OUTPATIENT
Start: 2018-05-03 | End: 2018-05-03 | Stop reason: HOSPADM

## 2018-05-03 RX ORDER — HYDROMORPHONE HYDROCHLORIDE 1 MG/ML
.3-.5 INJECTION, SOLUTION INTRAMUSCULAR; INTRAVENOUS; SUBCUTANEOUS EVERY 10 MIN PRN
Status: DISCONTINUED | OUTPATIENT
Start: 2018-05-03 | End: 2018-05-03 | Stop reason: HOSPADM

## 2018-05-03 RX ORDER — FENTANYL CITRATE 50 UG/ML
25-50 INJECTION, SOLUTION INTRAMUSCULAR; INTRAVENOUS EVERY 5 MIN PRN
Status: DISCONTINUED | OUTPATIENT
Start: 2018-05-03 | End: 2018-05-03 | Stop reason: HOSPADM

## 2018-05-03 RX ORDER — HYDROXYZINE HYDROCHLORIDE 25 MG/1
25 TABLET, FILM COATED ORAL
Status: COMPLETED | OUTPATIENT
Start: 2018-05-03 | End: 2018-05-03

## 2018-05-03 RX ORDER — SODIUM CHLORIDE, SODIUM LACTATE, POTASSIUM CHLORIDE, CALCIUM CHLORIDE 600; 310; 30; 20 MG/100ML; MG/100ML; MG/100ML; MG/100ML
INJECTION, SOLUTION INTRAVENOUS CONTINUOUS PRN
Status: DISCONTINUED | OUTPATIENT
Start: 2018-05-03 | End: 2018-05-03

## 2018-05-03 RX ORDER — ONDANSETRON 2 MG/ML
4 INJECTION INTRAMUSCULAR; INTRAVENOUS EVERY 30 MIN PRN
Status: DISCONTINUED | OUTPATIENT
Start: 2018-05-03 | End: 2018-05-03 | Stop reason: HOSPADM

## 2018-05-03 RX ORDER — ALBUTEROL SULFATE 90 UG/1
AEROSOL, METERED RESPIRATORY (INHALATION) PRN
Status: DISCONTINUED | OUTPATIENT
Start: 2018-05-03 | End: 2018-05-03

## 2018-05-03 RX ORDER — PHYSOSTIGMINE SALICYLATE 1 MG/ML
1.2 INJECTION INTRAVENOUS
Status: DISCONTINUED | OUTPATIENT
Start: 2018-05-03 | End: 2018-05-03 | Stop reason: HOSPADM

## 2018-05-03 RX ORDER — CEFAZOLIN SODIUM 2 G/100ML
2 INJECTION, SOLUTION INTRAVENOUS
Status: DISCONTINUED | OUTPATIENT
Start: 2018-05-03 | End: 2018-05-03 | Stop reason: DRUGHIGH

## 2018-05-03 RX ORDER — FENTANYL CITRATE 50 UG/ML
25-50 INJECTION, SOLUTION INTRAMUSCULAR; INTRAVENOUS
Status: DISCONTINUED | OUTPATIENT
Start: 2018-05-03 | End: 2018-05-03 | Stop reason: HOSPADM

## 2018-05-03 RX ORDER — NALOXONE HYDROCHLORIDE 0.4 MG/ML
.1-.4 INJECTION, SOLUTION INTRAMUSCULAR; INTRAVENOUS; SUBCUTANEOUS
Status: DISCONTINUED | OUTPATIENT
Start: 2018-05-03 | End: 2018-05-03 | Stop reason: HOSPADM

## 2018-05-03 RX ADMIN — HYDROMORPHONE HYDROCHLORIDE 0.5 MG: 1 INJECTION, SOLUTION INTRAMUSCULAR; INTRAVENOUS; SUBCUTANEOUS at 10:18

## 2018-05-03 RX ADMIN — ONDANSETRON 4 MG: 2 INJECTION INTRAMUSCULAR; INTRAVENOUS at 08:59

## 2018-05-03 RX ADMIN — PROPOFOL 200 MCG/KG/MIN: 10 INJECTION, EMULSION INTRAVENOUS at 07:31

## 2018-05-03 RX ADMIN — FENTANYL CITRATE 50 MCG: 50 INJECTION, SOLUTION INTRAMUSCULAR; INTRAVENOUS at 08:10

## 2018-05-03 RX ADMIN — DEXMEDETOMIDINE HYDROCHLORIDE 12 MCG: 100 INJECTION, SOLUTION INTRAVENOUS at 07:37

## 2018-05-03 RX ADMIN — HYDROMORPHONE HYDROCHLORIDE 0.5 MG: 1 INJECTION, SOLUTION INTRAMUSCULAR; INTRAVENOUS; SUBCUTANEOUS at 09:39

## 2018-05-03 RX ADMIN — ALBUTEROL SULFATE 2.5 MG: 2.5 SOLUTION RESPIRATORY (INHALATION) at 09:47

## 2018-05-03 RX ADMIN — FENTANYL CITRATE 50 MCG: 50 INJECTION, SOLUTION INTRAMUSCULAR; INTRAVENOUS at 07:46

## 2018-05-03 RX ADMIN — PROPOFOL 50 MG: 10 INJECTION, EMULSION INTRAVENOUS at 08:50

## 2018-05-03 RX ADMIN — DEXMEDETOMIDINE HYDROCHLORIDE 8 MCG: 100 INJECTION, SOLUTION INTRAVENOUS at 08:36

## 2018-05-03 RX ADMIN — FENTANYL CITRATE 50 MCG: 50 INJECTION, SOLUTION INTRAMUSCULAR; INTRAVENOUS at 08:00

## 2018-05-03 RX ADMIN — DEXAMETHASONE SODIUM PHOSPHATE 4 MG: 4 INJECTION, SOLUTION INTRA-ARTICULAR; INTRALESIONAL; INTRAMUSCULAR; INTRAVENOUS; SOFT TISSUE at 07:40

## 2018-05-03 RX ADMIN — PROPOFOL 50 MG: 10 INJECTION, EMULSION INTRAVENOUS at 09:01

## 2018-05-03 RX ADMIN — ALBUTEROL SULFATE 6 PUFF: 90 AEROSOL, METERED RESPIRATORY (INHALATION) at 08:29

## 2018-05-03 RX ADMIN — OXYCODONE HYDROCHLORIDE 5 MG: 5 TABLET ORAL at 10:44

## 2018-05-03 RX ADMIN — LIDOCAINE HYDROCHLORIDE 60 MG: 20 INJECTION, SOLUTION INFILTRATION; PERINEURAL at 07:31

## 2018-05-03 RX ADMIN — FENTANYL CITRATE 50 MCG: 50 INJECTION, SOLUTION INTRAMUSCULAR; INTRAVENOUS at 07:31

## 2018-05-03 RX ADMIN — FENTANYL CITRATE 50 MCG: 50 INJECTION, SOLUTION INTRAMUSCULAR; INTRAVENOUS at 08:29

## 2018-05-03 RX ADMIN — SODIUM CHLORIDE, POTASSIUM CHLORIDE, SODIUM LACTATE AND CALCIUM CHLORIDE: 600; 310; 30; 20 INJECTION, SOLUTION INTRAVENOUS at 07:15

## 2018-05-03 RX ADMIN — PROPOFOL 300 MG: 10 INJECTION, EMULSION INTRAVENOUS at 07:31

## 2018-05-03 RX ADMIN — Medication 3 G: at 07:36

## 2018-05-03 RX ADMIN — HYDROMORPHONE HYDROCHLORIDE 0.5 MG: 1 INJECTION, SOLUTION INTRAMUSCULAR; INTRAVENOUS; SUBCUTANEOUS at 09:52

## 2018-05-03 RX ADMIN — PROPOFOL 50 MG: 10 INJECTION, EMULSION INTRAVENOUS at 08:29

## 2018-05-03 RX ADMIN — HYDROXYZINE HYDROCHLORIDE 25 MG: 25 TABLET ORAL at 10:44

## 2018-05-03 RX ADMIN — DEXMEDETOMIDINE HYDROCHLORIDE 12 MCG: 100 INJECTION, SOLUTION INTRAVENOUS at 08:46

## 2018-05-03 RX ADMIN — MIDAZOLAM 2 MG: 1 INJECTION INTRAMUSCULAR; INTRAVENOUS at 07:10

## 2018-05-03 RX ADMIN — DEXMEDETOMIDINE HYDROCHLORIDE 8 MCG: 100 INJECTION, SOLUTION INTRAVENOUS at 07:40

## 2018-05-03 RX ADMIN — FENTANYL CITRATE 50 MCG: 50 INJECTION, SOLUTION INTRAMUSCULAR; INTRAVENOUS at 07:40

## 2018-05-03 ASSESSMENT — LIFESTYLE VARIABLES: TOBACCO_USE: 1

## 2018-05-03 NOTE — ANESTHESIA PROCEDURE NOTES
Peripheral nerve/Neuraxial procedure note : Popliteal  Pre-Procedure  Performed by JULIO CESAR CASANOVA  Location:       .   Procedure Documentation    .    Procedure:  right  Popliteal.       .  .             6610 Right popliteal and saphenous blocks for pain relief at surgeon's request.  With the patient in the prone position, after IV started,  pulse oximeter in place and Versed 2mg IV: 22 GA Stimuplex and 25 GA; 50cc (40 + 10) 1/2 % Ropivicaine; 1;200,000 Epi

## 2018-05-03 NOTE — IP AVS SNAPSHOT
MRN:[id number]                      After Visit Summary   5/3/2018    Cassandra Camejo    MRN: [id number]           Thank you!     Thank you for choosing [facility] for your care. Our goal is always to provide you with excellent care. Hearing back from our patients is one way we can continue to improve our services. Please take a few minutes to complete the written survey that you may receive in the mail after you visit with us. Thank you!        Patient Information     Date Of Birth          1985        About your hospital stay     You were admitted on:  May 3, 2018 [facility]    You were discharged on:  May 3, 2018       Who to Call     For medical emergencies, please call 911.  For non-urgent questions about your medical care, please call your primary care provider or clinic, [phone]  For questions related to your surgery, please call your surgery clinic        Attending Provider     Provider Chito Rivers MD Orthopedics       Primary Care Provider Office Phone # Fax #    Varinder Burk [phone]      After Care Instructions     Diet Instructions       Resume pre-procedure diet            Discharge Instructions       The patient will follow-up in clinic with Dr. Avalos ([facility]; [phone]) in 2 weeks for wound check, suture removal, and NWB XR of the right foot.            Do not - immerse incision in water until sutures removed       Do not immerse incision in water until sutures removed            Dressing       Keep Splint clean and dry.  Splint removed at 2 week post op appointment.            Ice to affected area       Ice to operative site PRN            No Alcohol       For 24 hours post procedure            No driving or operating machinery        until the day after procedure            No weight bearing       NWB RLE                  Further instructions  from your care team           Post-Operative Instruction Sheet for Foot and Ankle Surgery  Chito Avalos M.D.      These precautions MUST be followed for the first 24 hours after surgery:    Upon discharge, go directly home.    You MUST make arrangements for a responsible adult to stay with you the first night following surgery.  Surgery may be cancelled if you do not have someone that can stay with you.    DO NOT DRINK ALCOHOLIC BEVERAGES.    It is not unusual to feel lightheaded up to 24 hours after surgery or while taking pain medications.  If you feel lightheaded, sit up for a few minutes before standing and have someone assist you when you get up to walk or use the restroom.    Do not use any mechanical equipment or heavy machinery.    Do not make any important or legal decisions for 24 hours or while on pain medication.    You may experience dry mouth, sore throat, or sleep disturbances from the anesthesia and medications used during surgery.  Generalized muscle aches can sometimes occur.  These symptoms generally disappear by 24 hours.    The following are general guidelines and instructions about what to expect the first weeks following surgery.  These are not specific, and your recovery may be slightly different.  Please follow the instructions that are specifically written out for you after the surgery if there are any questions.    Pain Management   If you have received a nerve block, the pain relief can last anywhere from 12-30 hours, this also means you may not have sensation or movement in your foot for that amount of time. You will have pain after the block wears off!  Anticipate this and start pain meds prior to the block wearing off.     Take your first dose of the prescribed pain medication as soon as you get home, even if you have no pain.     Continue to take your medication continuously as prescribed for the first 24-48 hours - ensure that the patient (you) are alert and have no difficulty  breathing before taking the medication.  Often it may be helpful to set an alarm throughout the night to ensure you don t miss a dose of the medication and wake up with a lot more pain.    You can expect that the first two nights will be the most painful and uncomfortable. I will give you strong medication to make you as comfortable as possible, but you may still have some break-through pain.  This is not unexpected, as there are many nerve endings in the foot and ankle and many patients state the pain from foot surgery is worse than most other injuries or procedures!    After the first 24-48 hours, you may take the medication as needed for pain.    As your pain improves, you can gradually decrease your pain meds by substituting Tylenol or an anti-inflammatory medication.  You may also use these medications as directed early in the post-operative process to supplement the narcotic pain medication.    Dressing   Bleeding through the dressings is quite common.    This usually occurs for the first 1-2 hours after surgery. The actual bleeding has stopped by the time you see the drainage through your dressings.    You can reinforce the outside of the dressing with gauze and an Ace wrap unless otherwise directed.  In most cases, your first dressing change will be performed at your first clinic follow-up visit.  In some cases, I may allow you to change your dressings sooner.  Your dressing should be kept DRY at all times - do not shower, bathe, or wet your dressing in any way after surgery!    Wound Care  Once your stitches are removed, you can shower with soapy water and gently cleanse the incision if it is completely dry.     Do not shower or wash the incision(s) if parts of the incision(s) are open or still draining.    Do not soak the incision(s) in a bathtub or hot tub until the incision(s) is completely dry for one week.    Do not soak the incision(s) in lake or ocean water for at least one month  post-operatively.    Elevation   I recommend strict elevation of your foot above the level of your heart for 4-5 days.  Elevation of the foot remains important up to two weeks after surgery to limit swelling and help wound healing.    Elevate your foot/ankle to at least your waist level but above your heart would be best. The more you elevate your foot and ankle, the less pain you will have.     In the first few days following surgery, restrict the time your foot is  down  to 10 minutes or less at a time.    It is also good to keep your blood flowing through the operated leg and limit the risk of blood clots.  The first day following surgery, I would encourage you to get up and move around the house for a few minutes every hour and then return to elevating the foot.    After the strict elevation period (see above) is completed, you may gradually become more active. You should  listen  to your foot/ankle as to when to get off of it and elevate it again.  This may help even months after surgery.  Remember: avoid anything that hurts or makes your foot/ankle swell!    Icing   Icing can be very useful to decrease the pain and swelling of the foot.     Start by first placing a large garbage bag over the dressing.  Ice may then be placed around the extremity by using either bags of ice taped around the extremity   or you may place the extremity in a bucket filled with ice (the dressing MUST be covered with a plastic bag!).  Bags of frozen peas work well!    You should ice for no more than 30 minutes at a time and repeat at 2 hour intervals.     Do NOT place ice directly on your skin or dressing.     Activity     In most cases (unless otherwise instructed), you may not bear weight on your operated foot/ankle for 2 weeks after surgery.  That means the foot may not touch the ground when upright!  Specific weight bearing instructions for your surgery will be provided on the day of surgery.    Your toes may experience bruising  after surgery and become darker when the foot hangs down.  Unless you had surgery on those toes, it is important to actively wiggle your toes for 5-10 minutes each hour.    Many of your questions can be addressed at your 2-week follow-up appointment - please make a list of things to ask us as they come up during your recovery.    What to watch for      Severe swelling and/or pain in the calf: this could indicate a deep vein thrombosis (blood clot in leg) which requires urgent evaluation and treatment!    Profuse bleeding: that which soaks through your dressing and increases in size throughout the first day after surgery.    Blue or white toes: this indicates a lack of blood flow to the foot.     Fever greater than 101.5: fevers less than this are very common the first few days after surgery and are unlikely to indicate infection or any unexpected problem.    Severe pain: that which does not improve after pain medication, except for the first two nights.   If you have any of the above problems or any concerns, please contact my office ([phone]) and further instructions will be provided.  If you are unable to reach anyone or feel you have a medical emergency, please do not hesitate to go to the nearest urgent care or emergency department.    Medications   *Medications may take up to 24 hours to be refilled by my office.*    All pain medications, along with inactivity following surgery, can cause constipation.  Use the stool softeners as recommended, increase fluid intake to at least 1 quart per day, and increase your dietary fiber.  (The  p  fruits - peaches, plums, pears, and prunes - as well as anise/black licorice are generally helpful.)    I encourage you to utilize over the counter Tylenol and ibuprofen on a regular basis post-operatively to assist with pain control.  This will also help limit the amount of narcotic mediation you need beyond the first few days following surgery.  It is safe to use these  medications at the same time, along with the pain medications.      Antibiotics may be prescribed to limit the risk of infection only in limited circumstances.  Kelfex (cephalexin) 500 mg - This is an antibiotic given in addition to the antibiotic given during surgery to help reduce the chance of post-operative infection.   Dosage: 1 tablet by mouth 4 times a day.  OR   Cleocin (clindamycin) 600 mg - This is an antibiotic given to those patients who are allergic to penicillin in addition to the antibiotic given during surgery to help reduce the chance of post-operative infection.   Dosage: 1 tablet by mouth 3 times a day.      Anti-nausea  Hydroxyzine 25 mg  - This medicine should be taken if you experience any nausea or vomiting. If you know you are sensitive to narcotics please take 1 tablet 30 minutes prior to pain medication. This may also help with any mild itching experienced with the pain medication or muscle spasms.  Dosage: 1 tablet by mouth every 6 hours as needed for nausea, itching, spasms, or adjuvant pain control.      Pain medications (you will only be given a prescription for ONE of the following!)   Oxycodone 5mg - This is a pain medication that may be taken EVERY 3 to 4 HOURS for pain relief.  You should start the medication when you arrive home after surgery, before the nerve block wears off.  The first 1-2 nights you may need to take up to 2-3 tablets every 3-4 hours. You should be given enough medication to last to the first office visit.  This medication cannot be refilled over the phone!  Dosage: 1-3 tablets every 3 hours as needed for pain relief.  OR   Norco (hydrocodone/acetominophen) 5/325 mg - This is a pain medication that should be taken EVERY 4 TO 6 HOURS for pain relief. You should start the medication when you arrive home after surgery, before the nerve block wears off.  The first 1-2 nights you may need to take 2 tablets every 4 hours.  You should be given enough medication to last  to the first office visit.  This medication cannot be refilled over the phone!  Dosage: 1-2 tablets every 4-6 hours as needed for pain relief.  *Do NOT take any Tylenol while taking this medication!  You may alternate Tylenol with this medication provided you do not take greater than 3 grams of Tylenol in total over a 24 hour time period.      Blood thinner  Depending on the type of surgery and your personal risk factors, I may prescribe a medication to help limit the risk of developing a blood clot after your surgery.  The length of time to take the recommended medication will be provided and typically lasts until you are moving about normally or bearing weight on the operated foot/ankle.  ECASA (enteric coated aspirin) 325mg tablet daily.  Lovenox (enoxaparin) 40mg subcutaneous injection daily.  Xarelto 10mg tablet daily.      Stool Softener  Take an over the counter stool softener such as senna or Miralax starting the day after your surgery to prevent constipation. This is a common side effect of the narcotic pain medications.  You may stop taking this after you have regular bowel movements or no longer require use of narcotic pain medications.    Dental Implications  Dental procedures should be avoided until your incisions are healed. Furthermore, surgical procedures including hardware or an allograft require taking an antibiotic within one hour of all dental work within 6 months of surgery. Total ankle replacements require indefinite use of antibiotics prior to dental procedures. We would be happy to provide you with the necessary prescription upon request.    Follow-up Visits  You should have your initial post-operative visits already scheduled but if you do not recall the exact dates or do not believe they have been scheduled, please contact [facility] right away to ensure that we have the appropriate visits in the system.    You will likely follow-up with my physician assistant for your first post-operative visit  and for a few of the additional follow-up visits.  He works directly with me on all patients and will be able to inform me if there are any concerns during your recovery process!        You can often find additional information about your procedure or condition on the [facility] website at [email or web address] or [email or web address].    Additional information from reputable orthopaedic foot and ankle surgeons affiliated with the American Orthopaedic Foot and Ankle Society can be found at http://www.footcaremd.com.        **If you have questions or concerns about your procedure, call   Dr. Avalos at [phone].**    Same Day Surgery Discharge Instructions for  Sedation and General Anesthesia       It's not unusual to feel dizzy, light-headed or faint for up to 24 hours after surgery or while taking pain medication.  If you have these symptoms: sit for a few minutes before standing and have someone assist you when you get up to walk or use the bathroom.      You should rest and relax for the next 24 hours. We recommend you make arrangements to have an adult stay with you for at least 24 hours after your discharge.  Avoid hazardous and strenuous activity.      DO NOT DRIVE any vehicle or operate mechanical equipment for 24 hours following the end of your surgery.  Even though you may feel normal, your reactions may be affected by the medication you have received.      Do not drink alcoholic beverages for 24 hours following surgery.       Slowly progress to your regular diet as you feel able. It's not unusual to feel nauseated and/or vomit after receiving anesthesia.  If you develop these symptoms, drink clear liquids (apple juice, ginger ale, broth, 7-up, etc. ) until you feel better.  If your nausea and vomiting persists for 24 hours, please notify your surgeon.        All narcotic pain medications, along with inactivity and anesthesia, can cause constipation. Drinking  "plenty of liquids and increasing fiber intake will help.      For any questions of a medical nature, call your surgeon.      Do not make important decisions for 24 hours.      If you had general anesthesia, you may have a sore throat for a couple of days related to the breathing tube used during surgery.  You may use Cepacol lozenges to help with this discomfort.  If it worsens or if you develop a fever, contact your surgeon.       If you feel your pain is not well managed with the pain medications prescribed by your surgeon, please contact your surgeon's office to let them know so they can address your concerns.             Pending Results     Date and Time Order Name Status Description    5/3/2018 0916 XR Surgery JATINDER L/T 5 Min Fluoro w Stills In process             Admission Information     Date & Time Provider Department Dept. Phone    5/3/2018 Chito Avalos MD [facility] Same Day Surgery [phone]      Your Vitals Were     Blood Pressure Temperature Respirations Height Weight Last Period    140/82 97.5  F (36.4  C) 17 1.651 m (5' 5\") 125.6 kg (276 lb 14.4 oz) 2018    Pulse Oximetry BMI (Body Mass Index)                95% 46.08 kg/m2          MyChart Information     [employer] lets you send messages to your doctor, view your test results, renew your prescriptions, schedule appointments and more. To sign up, go to [email or web address] . Click on \"Log in\" on the left side of the screen, which will take you to the Welcome page. Then click on \"Sign up Now\" on the right side of the page.     You will be asked to enter the access code listed below, as well as some personal information. Please follow the directions to create your username and password.     Your access code is: [email or web address]  Expires: 2018  9:00 PM     Your access code will  in 90 days. If you need help or a new code, please call your [place] clinic or [phone].        Care EveryWhere ID     This is your Care " EveryWhere ID. This could be used by other organizations to access your [place] medical records  [id number]        Equal Access to Services     MARGARITA CONTRERAS : Jony Benton, wamerrittda saranya, nanvelia romannichoda michael, selwyn vaughan nicholasmaddy fuentesanai demetriuslisariya fitzgerald. So [facility] [phone].    ATENCIÓN: Si habla español, tiene a begum disposición servicios gratuitos de asistencia lingüística. Llame al [phone].    We comply with applicable federal civil rights laws and [place] laws. We do not discriminate on the basis of race, color, national origin, age, disability, sex, sexual orientation, or gender identity.               Review of your medicines      START taking        Dose / Directions    aspirin 325 MG EC tablet   Used for:  Failure of joint fusion, subsequent encounter        Dose:  325 mg   Take 1 tablet (325 mg) by mouth every 6 hours as needed for moderate pain   Quantity:  42 tablet   Refills:  0       hydrOXYzine 25 MG tablet   Commonly known as:  ATARAX   Used for:  Failure of joint fusion, subsequent encounter   Notes to Patient:  One tablet given at 10:45am          Dose:  25 mg   Take 1 tablet (25 mg) by mouth every 6 hours as needed for itching (nausea, muscle spasms, and adjuvant pain.)   Quantity:  30 tablet   Refills:  0       oxyCODONE IR 5 MG tablet   Commonly known as:  ROXICODONE   Used for:  Failure of joint fusion, subsequent encounter   Notes to Patient:  One tablet given at 10:45am          Dose:  5-10 mg   Take 1-2 tablets (5-10 mg) by mouth every 3 hours as needed for pain or other (Moderate to Severe)   Quantity:  40 tablet   Refills:  0       senna-docusate 8.6-50 MG per tablet   Commonly known as:  SENOKOT-S;PERICOLACE   Used for:  Failure of joint fusion, subsequent encounter        Dose:  1-2 tablet   Take 1-2 tablets by mouth 2 times daily Take while on oral narcotics to prevent or treat constipation.   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        Dose:  650 mg   Take 2 tablets (650 mg) by mouth every 4 hours as needed for other (mild pain)   Quantity:  100 tablet   Refills:  0       albuterol 108 (90 Base) MCG/ACT Inhaler   Commonly known as:  PROAIR HFA/PROVENTIL HFA/VENTOLIN HFA        Dose:  2 puff   Inhale 2 puffs into the lungs every 6 hours as needed   Refills:  0       fluticasone-salmeterol 100-50 MCG/DOSE diskus inhaler   Commonly known as:  ADVAIR        Dose:  1 puff   Inhale 1 puff into the lungs 2 times daily   Refills:  0       gabapentin 300 MG capsule   Commonly known as:  NEURONTIN   Used for:  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        Dose:  300 mg   Take 1 capsule (300 mg) by mouth 3 times daily   Quantity:  9 capsule   Refills:  0       IBUPROFEN PO        Dose:  400 mg   Take 400 mg by mouth every 6 hours as needed for moderate pain   Refills:  0       ipratropium - albuterol 0.5 mg/2.5 mg/3 mL 0.5-2.5 (3) MG/3ML neb solution   Commonly known as:  DUONEB   Used for:  Asthma with acute exacerbation, unspecified asthma severity        Dose:  3 mL   Take 1 vial (3 mLs) by nebulization every 4 hours as needed for wheezing   Quantity:  360 mL   Refills:  0       order for DME   Used for:  Asthma with acute exacerbation, unspecified asthma severity        Equipment being ordered: Other: nebulization machine Treatment Diagnosis: acute asthma exacerbation   Quantity:  1 Device   Refills:  0         STOP taking     LORTAB 5-325 MG per tablet   Generic drug:  HYDROcodone-acetaminophen                Where to get your medicines      These medications were sent to [facility]- [place], [place] - [place] - [address] [address]     Phone:  [phone]     aspirin 325 MG EC tablet    hydrOXYzine 25  MG tablet    senna-docusate 8.6-50 MG per tablet         Some of these will need a paper prescription and others can be bought over the counter. Ask your nurse if you have questions.     Bring a paper prescription for each of these medications     oxyCODONE IR 5 MG tablet                Protect others around you: Learn how to safely use, store and throw away your medicines at www.disposemymeds.org.        Information about OPIOIDS     PRESCRIPTION OPIOIDS: WHAT YOU NEED TO KNOW   You have a prescription for an opioid (narcotic) pain medicine. Opioids can cause addiction. If you have a history of chemical dependency of any type, you are at a higher risk of becoming addicted to opioids. Only take this medicine after all other options have been tried. Take it for as short a time and as few doses as possible.     Do not:    Drive. If you drive while taking these medicines, you could be arrested for driving under the influence (DUI).    Operate heavy machinery    Do any other dangerous activities while taking these medicines.     Drink any alcohol while taking these medicines.      Take with any other medicines that contain acetaminophen. Read all labels carefully. Look for the word  acetaminophen  or  Tylenol.  Ask your pharmacist if you have questions or are unsure.    Store your pills in a secure place, locked if possible. We will not replace any lost or stolen medicine. If you don t finish your medicine, please throw away (dispose) as directed by your pharmacist. The [place] Pollution Control Agency has more information about safe disposal: https://www.pca.[facility].[place].us/living-green/managing-unwanted-medications    All opioids tend to cause constipation. Drink plenty of water and eat foods that have a lot of fiber, such as fruits, vegetables, prune juice, apple juice and high-fiber cereal. Take a laxative (Miralax, milk of magnesia, Colace, Senna) if you don t move your bowels at least every other day.               Medication List: This is a list of all your medications and when to take them. Check marks below indicate your daily home schedule. Keep this list as a reference.      Medications           Morning Afternoon Evening Bedtime As Needed    acetaminophen 325 MG tablet   Commonly known as:  TYLENOL   Take 2 tablets (650 mg) by mouth every 4 hours as needed for other (mild pain)                                albuterol 108 (90 Base) MCG/ACT Inhaler   Commonly known as:  PROAIR HFA/PROVENTIL HFA/VENTOLIN HFA   Inhale 2 puffs into the lungs every 6 hours as needed   Last time this was given:  6 puffs on 5/3/2018  8:29 AM                                aspirin 325 MG EC tablet   Take 1 tablet (325 mg) by mouth every 6 hours as needed for moderate pain                                fluticasone-salmeterol 100-50 MCG/DOSE diskus inhaler   Commonly known as:  ADVAIR   Inhale 1 puff into the lungs 2 times daily                                gabapentin 300 MG capsule   Commonly known as:  NEURONTIN   Take 1 capsule (300 mg) by mouth 3 times daily                                hydrOXYzine 25 MG tablet   Commonly known as:  ATARAX   Take 1 tablet (25 mg) by mouth every 6 hours as needed for itching (nausea, muscle spasms, and adjuvant pain.)   Last time this was given:  25 mg on 5/3/2018 10:44 AM   Notes to Patient:  One tablet given at 10:45am                                  IBUPROFEN PO   Take 400 mg by mouth every 6 hours as needed for moderate pain                                ipratropium - albuterol 0.5 mg/2.5 mg/3 mL 0.5-2.5 (3) MG/3ML neb solution   Commonly known as:  DUONEB   Take 1 vial (3 mLs) by nebulization every 4 hours as needed for wheezing                                order for DME   Equipment being ordered: Other: nebulization machine Treatment Diagnosis: acute asthma exacerbation                                oxyCODONE IR 5 MG tablet   Commonly known as:  ROXICODONE   Take 1-2 tablets (5-10 mg) by mouth  every 3 hours as needed for pain or other (Moderate to Severe)   Last time this was given:  5 mg on 5/3/2018 10:44 AM   Notes to Patient:  One tablet given at 10:45am                                  senna-docusate 8.6-50 MG per tablet   Commonly known as:  SENOKOT-S;PERICOLACE   Take 1-2 tablets by mouth 2 times daily Take while on oral narcotics to prevent or treat constipation.

## 2018-05-03 NOTE — ANESTHESIA CARE TRANSFER NOTE
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    Anesthesia Type:   General, LMA, Periph. Nerve Block for postop pain     Note:  Airway :Face Mask  Patient transferred to:PACU  Comments: Suctioned, spont resp ,lifts head  > 5 sec. Extubated with immediate exchange, to PACU, report to RN.Suctioned, spont resp ,lifts head  > 5 sec. Extubated with immediate exchange, to PACU, report to RN.Handoff Report: Identifed the Patient, Identified the Reponsible Provider, Reviewed the pertinent medical history, Discussed the surgical course, Reviewed Intra-OP anesthesia mangement and issues during anesthesia, Set expectations for post-procedure period and Allowed opportunity for questions and acknowledgement of understanding      Vitals: (Last set prior to Anesthesia Care Transfer)    [facility] VITALS  5/3/2018 0858 - 5/3/2018 0937      5/3/2018             Pulse: 96    SpO2: (!)  88 %    Resp Rate (set): 10                Electronically Signed By: JUAN Montenegro CRNA  May 3, 2018  9:37 AM

## 2018-05-03 NOTE — DISCHARGE INSTRUCTIONS
Post-Operative Instruction Sheet for Foot and Ankle Surgery  Chito Avalos M.D.      These precautions MUST be followed for the first 24 hours after surgery:    Upon discharge, go directly home.    You MUST make arrangements for a responsible adult to stay with you the first night following surgery.  Surgery may be cancelled if you do not have someone that can stay with you.    DO NOT DRINK ALCOHOLIC BEVERAGES.    It is not unusual to feel lightheaded up to 24 hours after surgery or while taking pain medications.  If you feel lightheaded, sit up for a few minutes before standing and have someone assist you when you get up to walk or use the restroom.    Do not use any mechanical equipment or heavy machinery.    Do not make any important or legal decisions for 24 hours or while on pain medication.    You may experience dry mouth, sore throat, or sleep disturbances from the anesthesia and medications used during surgery.  Generalized muscle aches can sometimes occur.  These symptoms generally disappear by 24 hours.    The following are general guidelines and instructions about what to expect the first weeks following surgery.  These are not specific, and your recovery may be slightly different.  Please follow the instructions that are specifically written out for you after the surgery if there are any questions.    Pain Management   If you have received a nerve block, the pain relief can last anywhere from 12-30 hours, this also means you may not have sensation or movement in your foot for that amount of time. You will have pain after the block wears off!  Anticipate this and start pain meds prior to the block wearing off.     Take your first dose of the prescribed pain medication as soon as you get home, even if you have no pain.     Continue to take your medication continuously as prescribed for the first 24-48 hours - ensure that the patient (you) are alert and have no difficulty breathing before taking the  medication.  Often it may be helpful to set an alarm throughout the night to ensure you don t miss a dose of the medication and wake up with a lot more pain.    You can expect that the first two nights will be the most painful and uncomfortable. I will give you strong medication to make you as comfortable as possible, but you may still have some break-through pain.  This is not unexpected, as there are many nerve endings in the foot and ankle and many patients state the pain from foot surgery is worse than most other injuries or procedures!    After the first 24-48 hours, you may take the medication as needed for pain.    As your pain improves, you can gradually decrease your pain meds by substituting Tylenol or an anti-inflammatory medication.  You may also use these medications as directed early in the post-operative process to supplement the narcotic pain medication.    Dressing   Bleeding through the dressings is quite common.    This usually occurs for the first 1-2 hours after surgery. The actual bleeding has stopped by the time you see the drainage through your dressings.    You can reinforce the outside of the dressing with gauze and an Ace wrap unless otherwise directed.  In most cases, your first dressing change will be performed at your first clinic follow-up visit.  In some cases, I may allow you to change your dressings sooner.  Your dressing should be kept DRY at all times - do not shower, bathe, or wet your dressing in any way after surgery!    Wound Care  Once your stitches are removed, you can shower with soapy water and gently cleanse the incision if it is completely dry.     Do not shower or wash the incision(s) if parts of the incision(s) are open or still draining.    Do not soak the incision(s) in a bathtub or hot tub until the incision(s) is completely dry for one week.    Do not soak the incision(s) in lake or ocean water for at least one month post-operatively.    Elevation   I recommend  strict elevation of your foot above the level of your heart for 4-5 days.  Elevation of the foot remains important up to two weeks after surgery to limit swelling and help wound healing.    Elevate your foot/ankle to at least your waist level but above your heart would be best. The more you elevate your foot and ankle, the less pain you will have.     In the first few days following surgery, restrict the time your foot is  down  to 10 minutes or less at a time.    It is also good to keep your blood flowing through the operated leg and limit the risk of blood clots.  The first day following surgery, I would encourage you to get up and move around the house for a few minutes every hour and then return to elevating the foot.    After the strict elevation period (see above) is completed, you may gradually become more active. You should  listen  to your foot/ankle as to when to get off of it and elevate it again.  This may help even months after surgery.  Remember: avoid anything that hurts or makes your foot/ankle swell!    Icing   Icing can be very useful to decrease the pain and swelling of the foot.     Start by first placing a large garbage bag over the dressing.  Ice may then be placed around the extremity by using either bags of ice taped around the extremity   or you may place the extremity in a bucket filled with ice (the dressing MUST be covered with a plastic bag!).  Bags of frozen peas work well!    You should ice for no more than 30 minutes at a time and repeat at 2 hour intervals.     Do NOT place ice directly on your skin or dressing.     Activity     In most cases (unless otherwise instructed), you may not bear weight on your operated foot/ankle for 2 weeks after surgery.  That means the foot may not touch the ground when upright!  Specific weight bearing instructions for your surgery will be provided on the day of surgery.    Your toes may experience bruising after surgery and become darker when the foot  hangs down.  Unless you had surgery on those toes, it is important to actively wiggle your toes for 5-10 minutes each hour.    Many of your questions can be addressed at your 2-week follow-up appointment - please make a list of things to ask us as they come up during your recovery.    What to watch for      Severe swelling and/or pain in the calf: this could indicate a deep vein thrombosis (blood clot in leg) which requires urgent evaluation and treatment!    Profuse bleeding: that which soaks through your dressing and increases in size throughout the first day after surgery.    Blue or white toes: this indicates a lack of blood flow to the foot.     Fever greater than 101.5: fevers less than this are very common the first few days after surgery and are unlikely to indicate infection or any unexpected problem.    Severe pain: that which does not improve after pain medication, except for the first two nights.   If you have any of the above problems or any concerns, please contact my office ([phone]) and further instructions will be provided.  If you are unable to reach anyone or feel you have a medical emergency, please do not hesitate to go to the nearest urgent care or emergency department.    Medications   *Medications may take up to 24 hours to be refilled by my office.*    All pain medications, along with inactivity following surgery, can cause constipation.  Use the stool softeners as recommended, increase fluid intake to at least 1 quart per day, and increase your dietary fiber.  (The  p  fruits - peaches, plums, pears, and prunes - as well as anise/black licorice are generally helpful.)    I encourage you to utilize over the counter Tylenol and ibuprofen on a regular basis post-operatively to assist with pain control.  This will also help limit the amount of narcotic mediation you need beyond the first few days following surgery.  It is safe to use these medications at the same time, along with the pain  medications.      Antibiotics may be prescribed to limit the risk of infection only in limited circumstances.  Kelfex (cephalexin) 500 mg - This is an antibiotic given in addition to the antibiotic given during surgery to help reduce the chance of post-operative infection.   Dosage: 1 tablet by mouth 4 times a day.  OR   Cleocin (clindamycin) 600 mg - This is an antibiotic given to those patients who are allergic to penicillin in addition to the antibiotic given during surgery to help reduce the chance of post-operative infection.   Dosage: 1 tablet by mouth 3 times a day.      Anti-nausea  Hydroxyzine 25 mg  - This medicine should be taken if you experience any nausea or vomiting. If you know you are sensitive to narcotics please take 1 tablet 30 minutes prior to pain medication. This may also help with any mild itching experienced with the pain medication or muscle spasms.  Dosage: 1 tablet by mouth every 6 hours as needed for nausea, itching, spasms, or adjuvant pain control.      Pain medications (you will only be given a prescription for ONE of the following!)   Oxycodone 5mg - This is a pain medication that may be taken EVERY 3 to 4 HOURS for pain relief.  You should start the medication when you arrive home after surgery, before the nerve block wears off.  The first 1-2 nights you may need to take up to 2-3 tablets every 3-4 hours. You should be given enough medication to last to the first office visit.  This medication cannot be refilled over the phone!  Dosage: 1-3 tablets every 3 hours as needed for pain relief.  OR   Norco (hydrocodone/acetominophen) 5/325 mg - This is a pain medication that should be taken EVERY 4 TO 6 HOURS for pain relief. You should start the medication when you arrive home after surgery, before the nerve block wears off.  The first 1-2 nights you may need to take 2 tablets every 4 hours.  You should be given enough medication to last to the first office visit.  This medication cannot  be refilled over the phone!  Dosage: 1-2 tablets every 4-6 hours as needed for pain relief.  *Do NOT take any Tylenol while taking this medication!  You may alternate Tylenol with this medication provided you do not take greater than 3 grams of Tylenol in total over a 24 hour time period.      Blood thinner  Depending on the type of surgery and your personal risk factors, I may prescribe a medication to help limit the risk of developing a blood clot after your surgery.  The length of time to take the recommended medication will be provided and typically lasts until you are moving about normally or bearing weight on the operated foot/ankle.  ECASA (enteric coated aspirin) 325mg tablet daily.  Lovenox (enoxaparin) 40mg subcutaneous injection daily.  Xarelto 10mg tablet daily.      Stool Softener  Take an over the counter stool softener such as senna or Miralax starting the day after your surgery to prevent constipation. This is a common side effect of the narcotic pain medications.  You may stop taking this after you have regular bowel movements or no longer require use of narcotic pain medications.    Dental Implications  Dental procedures should be avoided until your incisions are healed. Furthermore, surgical procedures including hardware or an allograft require taking an antibiotic within one hour of all dental work within 6 months of surgery. Total ankle replacements require indefinite use of antibiotics prior to dental procedures. We would be happy to provide you with the necessary prescription upon request.    Follow-up Visits  You should have your initial post-operative visits already scheduled but if you do not recall the exact dates or do not believe they have been scheduled, please contact [facility] right away to ensure that we have the appropriate visits in the system.    You will likely follow-up with my physician assistant for your first post-operative visit and for a few of the additional follow-up visits.   He works directly with me on all patients and will be able to inform me if there are any concerns during your recovery process!        You can often find additional information about your procedure or condition on the [facility] website at [email or web address] or [email or web address].    Additional information from reputable orthopaedic foot and ankle surgeons affiliated with the [employer] can be found at [email or web address].        **If you have questions or concerns about your procedure, call   Dr. Avalos at [phone].**    Same Day Surgery Discharge Instructions for  Sedation and General Anesthesia       It's not unusual to feel dizzy, light-headed or faint for up to 24 hours after surgery or while taking pain medication.  If you have these symptoms: sit for a few minutes before standing and have someone assist you when you get up to walk or use the bathroom.      You should rest and relax for the next 24 hours. We recommend you make arrangements to have an adult stay with you for at least 24 hours after your discharge.  Avoid hazardous and strenuous activity.      DO NOT DRIVE any vehicle or operate mechanical equipment for 24 hours following the end of your surgery.  Even though you may feel normal, your reactions may be affected by the medication you have received.      Do not drink alcoholic beverages for 24 hours following surgery.       Slowly progress to your regular diet as you feel able. It's not unusual to feel nauseated and/or vomit after receiving anesthesia.  If you develop these symptoms, drink clear liquids (apple juice, ginger ale, broth, 7-up, etc. ) until you feel better.  If your nausea and vomiting persists for 24 hours, please notify your surgeon.        All narcotic pain medications, along with inactivity and anesthesia, can cause constipation. Drinking plenty of liquids and increasing fiber intake will  help.      For any questions of a medical nature, call your surgeon.      Do not make important decisions for 24 hours.      If you had general anesthesia, you may have a sore throat for a couple of days related to the breathing tube used during surgery.  You may use Cepacol lozenges to help with this discomfort.  If it worsens or if you develop a fever, contact your surgeon.       If you feel your pain is not well managed with the pain medications prescribed by your surgeon, please contact your surgeon's office to let them know so they can address your concerns.

## 2018-05-03 NOTE — OP NOTE
PREOPERATIVE DIAGNOSIS: Right talonavicular nonunion.    POSTOPERATIVE DIAGNOSIS: Right talonavicular nonunion.    PROCEDURE(S):   1.  Right revision talonavicular arthrodesis.  2.  Right hindfoot hardware removal.    ATTENDING SURGEON: Dr. Chito Avalos.    ASSISTANT SURGEON: Chema Butcher PA-C.    ANESTHESIA: General with regional nerve block.    EBL: Minimal.    IMPLANTS: [id number] V92 graft; 4-hole plate with 3.5mm locking screws; 7.0mm partially-threaded headless cannulated screw; crushed cancellous allograft.    COMPLICATIONS: None apparent.    A skilled surgical assistant, Chema Butcher PA-C, was necessary and utilized during the case to assist with patient positioning, prepping and draping, completing the soft tissue/bone work, wound closure, and dressing/splint application.  The assistant was utilized throughout the entire case.    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  The patient healed the subtalar arthrodesis but unfortunately has developed a nonunion with failure of hardware at the talonavicular joint.  Given the patient's persistent pain and swelling and lack of healing at the talonavicular arthrodesis, the above operative intervention was offered.  After full discussion of the benefits and risks of surgery, the patient provided informed consent to proceed.    The patient was identified in the pre-operative holding area on the date of surgery.  The operative site was marked with indelible marker and the patient was brought back to the operating room and transferred to the operating table in a supine position.  All bony prominences were well-padded.  Anesthesia was administered without complication.  The right lower extremity was prepped and draped in standard sterile fashion.  A  pre-operative timeout was performed identifying the correct patient, procedure, operative site, antibiotic administration, and equipment necessary for the procedure.    After Esmarch exsanguination, an upper thigh tourniquet was inflated.  The patient's previous medial hindfoot incision was utilized and soft tissue dissection was carefully carried down maintaining excellent hemostasis.  The plate traversing the talonavicular joint was identified and removed without complication.  A small incision was made over the dorsal hindfoot and the head of the lateral talonavicular screw was identified and the screw was backed out without complication.  The broken distal tip of the screw was left retained within the talar body.     A curved osteotome was then utilized to identify the fibrous nonunion at the talonavicular joint.  Lamina spreaders were placed within the joint to allow for adequate exposure of bone and to prepare the joint for arthrodesis.  A combination of curette and curved osteotome were utilized to debride the fibrous tissue within the nonunion site and prepare the subchondral bone for arthrodesis.  A fenestrated drill bit was utilized to perforate the subchondral bone on both sides of the joint to enhance arthrodesis.  A combination of V92 graft and crushed cancellous allograft were utilized and impacted into the joint to enhance arthrodesis.  Once identifying excellent compression across the joint and stable alignment of the joint, the joint was secured with a medial plate with 3.5 mm locking screws and a lateral 7.0 mm headless compression screw.  Fluoroscopic images confirmed appropriate positioning of the hardware with excellent compression across the talonavicular arthrodesis site.  The wounds were copiously irrigated.  Deep fascia was reapproximated within the medial incision with 2-0 Vicryl suture.  Subcutaneous tissues and skin were reapproximated with interrupted 3-0 Monocryl and 3-0 nylon sutures  respectively.  Xeroform and sterile dressings were applied.  The patient was placed in a short leg splint.  The patient was extubated and brought to the PACU in stable condition for further postoperative care.    Postoperatively, the patient will remain strictly nonweightbearing on the right lower extremity.  The patient will be placed on aspirin for DVT prophylaxis postoperatively.  The patient will return to clinic for follow-up in 2 weeks for repeat evaluation including wound check, suture removal, and nonweightbearing radiographs of the right foot.    Of note, all counts were correct at the conclusion of the case.

## 2018-05-03 NOTE — ANESTHESIA PREPROCEDURE EVALUATION
Anesthesia Evaluation     . Pt has had prior anesthetic.     History of anesthetic complications (patient has brother with diagnosed malignant hyperthermia; patient never tested for this; has had multiple previous surgeries without incident)   - PONV        ROS/MED HX    ENT/Pulmonary:     (+)obese, tobacco use (quit 3 months ago), Past use 0.5 PPD packs/day  Mild Persistent asthma Treatment: Inhaler prn,  , recent URI resolved . .   ELLEN Risk Factors: BMI: 38.3.   Neurologic:     (+)migraines,     Cardiovascular:  - neg cardiovascular ROS       METS/Exercise Tolerance:     Hematologic:  - neg hematologic  ROS       Musculoskeletal:   (+) arthritis, , , other musculoskeletal- Club Feet, DJD      GI/Hepatic:     (+) GERD Asymptomatic on medication, cholecystitis/cholelithiasis (S/P cholecystectomy),       Renal/Genitourinary:         Endo:     (+) Obesity (BMI 45), .      Psychiatric:         Infectious Disease:  - neg infectious disease ROS       Malignancy:      - no malignancy   Other:    (+) No chance of pregnancy H/O Chronic Pain,H/O chronic opiod use ,                    Physical Exam  Normal systems: cardiovascular and dental    Airway   Mallampati: III  TM distance: >3 FB  Neck ROM: full    Dental     Cardiovascular   Rhythm and rate: regular and normal      Pulmonary    breath sounds clear to auscultation(+) decreased breath sounds     PE comment: Some faint wheezing; cleared with cough                    Anesthesia Plan      History & Physical Review  History and physical reviewed and following examination; no interval change.    ASA Status:  3 .    NPO Status:  > 8 hours    Plan for General, LMA and Periph. Nerve Block for postop pain with Intravenous and Propofol induction. Maintenance will be TIVA.    PONV prophylaxis:  Ondansetron (or other 5HT-3) and Dexamethasone or Solumedrol  MALIGNANT HYPERTHERMIA PRECAUTIONS      Postoperative Care  Postoperative pain management:  IV analgesics, Oral pain  medications and Peripheral nerve block (Single Shot).      Consents  Anesthetic plan, risks, benefits and alternatives discussed with:  Patient..        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   Current Outpatient Prescriptions on File Prior to Encounter:  acetaminophen (TYLENOL) 325 MG tablet Take 2 tablets (650 mg) by mouth every 4 hours as needed for other (mild pain)   albuterol (PROAIR HFA, PROVENTIL HFA, VENTOLIN HFA) 108 (90 BASE) MCG/ACT inhaler Inhale 2 puffs into the lungs every 6 hours as needed    gabapentin (NEURONTIN) 300 MG capsule Take 1 capsule (300 mg) by mouth 3 times daily   ipratropium - albuterol 0.5 mg/2.5 mg/3 mL (DUONEB) 0.5-2.5 (3) MG/3ML nebulization Take 1 vial (3 mLs) by nebulization every 4 hours as needed for wheezing   order for DME Equipment being ordered: Other: nebulization machineTreatment Diagnosis: acute asthma exacerbation     Hemoglobin   Date Value Ref Range Status   08/16/2017 11.8 11.7 - 15.7 g/dL Final     Potassium   Date Value Ref Range Status   08/16/2017 3.9 3.4 - 5.3 mmol/L Final                       .

## 2018-05-03 NOTE — IP AVS SNAPSHOT
[facility]    [address]    [place] [address]    Phone:  [phone]    Fax:  [phone]                                       After Visit Summary   5/3/2018    Cassandra Camejo    MRN: [id number]           After Visit Summary Signature Page     I have received my discharge instructions, and my questions have been answered. I have discussed any challenges I see with this plan with the nurse or doctor.    ..........................................................................................................................................  Patient/Patient Representative Signature      ..........................................................................................................................................  Patient Representative Print Name and Relationship to Patient    ..................................................               ................................................  Date                                            Time    ..........................................................................................................................................  Reviewed by Signature/Title    ...................................................              ..............................................  Date                                                            Time

## 2018-10-29 ENCOUNTER — APPOINTMENT (OUTPATIENT)
Dept: GENERAL RADIOLOGY | Facility: HOSPITAL | Age: 33
End: 2018-10-29
Attending: PHYSICIAN ASSISTANT
Payer: MEDICARE

## 2018-10-29 ENCOUNTER — HOSPITAL ENCOUNTER (EMERGENCY)
Facility: HOSPITAL | Age: 33
Discharge: HOME | End: 2018-10-29
Attending: PHYSICIAN ASSISTANT | Admitting: PHYSICIAN ASSISTANT
Payer: MEDICARE

## 2018-10-29 VITALS
RESPIRATION RATE: 16 BRPM | OXYGEN SATURATION: 96 % | DIASTOLIC BLOOD PRESSURE: 79 MMHG | HEART RATE: 98 BPM | TEMPERATURE: 98.3 F | SYSTOLIC BLOOD PRESSURE: 135 MMHG

## 2018-10-29 DIAGNOSIS — J45.901 EXACERBATION OF ASTHMA, UNSPECIFIED ASTHMA SEVERITY, UNSPECIFIED WHETHER PERSISTENT: ICD-10-CM

## 2018-10-29 DIAGNOSIS — J20.9 ACUTE BRONCHITIS, UNSPECIFIED ORGANISM: ICD-10-CM

## 2018-10-29 PROCEDURE — 99283 EMERGENCY DEPT VISIT LOW MDM: CPT | Mod: 25

## 2018-10-29 PROCEDURE — 94640 AIRWAY INHALATION TREATMENT: CPT

## 2018-10-29 PROCEDURE — 25000125 ZZHC RX 250: Performed by: PHYSICIAN ASSISTANT

## 2018-10-29 PROCEDURE — 40000275 ZZH STATISTIC RCP TIME EA 10 MIN

## 2018-10-29 PROCEDURE — 99284 EMERGENCY DEPT VISIT MOD MDM: CPT | Performed by: PHYSICIAN ASSISTANT

## 2018-10-29 PROCEDURE — 71046 X-RAY EXAM CHEST 2 VIEWS: CPT | Mod: TC

## 2018-10-29 RX ORDER — HYDROCODONE BITARTRATE AND ACETAMINOPHEN 5; 325 MG/1; MG/1
1 TABLET ORAL EVERY 6 HOURS PRN
COMMUNITY

## 2018-10-29 RX ORDER — PREDNISONE 20 MG/1
TABLET ORAL
Qty: 10 TABLET | Refills: 0 | Status: SHIPPED | OUTPATIENT
Start: 2018-10-29

## 2018-10-29 RX ORDER — IPRATROPIUM BROMIDE AND ALBUTEROL SULFATE 2.5; .5 MG/3ML; MG/3ML
1 SOLUTION RESPIRATORY (INHALATION) EVERY 6 HOURS PRN
Qty: 360 ML | Refills: 3 | Status: SHIPPED | OUTPATIENT
Start: 2018-10-29

## 2018-10-29 RX ORDER — AZITHROMYCIN 250 MG/1
TABLET, FILM COATED ORAL
Qty: 6 TABLET | Refills: 0 | Status: SHIPPED | OUTPATIENT
Start: 2018-10-29 | End: 2018-11-03

## 2018-10-29 RX ORDER — ALBUTEROL SULFATE 0.83 MG/ML
2.5 SOLUTION RESPIRATORY (INHALATION) EVERY 6 HOURS PRN
Qty: 75 ML | Refills: 0 | Status: SHIPPED | OUTPATIENT
Start: 2018-10-29

## 2018-10-29 RX ORDER — IPRATROPIUM BROMIDE AND ALBUTEROL SULFATE 2.5; .5 MG/3ML; MG/3ML
3 SOLUTION RESPIRATORY (INHALATION) ONCE
Status: COMPLETED | OUTPATIENT
Start: 2018-10-29 | End: 2018-10-29

## 2018-10-29 RX ADMIN — IPRATROPIUM BROMIDE AND ALBUTEROL SULFATE 3 ML: .5; 3 SOLUTION RESPIRATORY (INHALATION) at 13:17

## 2018-10-29 ASSESSMENT — ENCOUNTER SYMPTOMS
CHEST TIGHTNESS: 1
NECK PAIN: 0
PALPITATIONS: 0
VOMITING: 0
SHORTNESS OF BREATH: 0
CHILLS: 0
NEUROLOGICAL NEGATIVE: 1
WHEEZING: 1
SORE THROAT: 0
STRIDOR: 0
FEVER: 1
BACK PAIN: 0
ABDOMINAL PAIN: 0
NECK STIFFNESS: 0
COUGH: 1
NAUSEA: 0

## 2018-10-29 NOTE — ED NOTES
Presents with URI since Saturday with fever Saturday. Reports hx of pneumonia last year and low oxygen saturations. States she has productive cough of brown sputum. States slight shortness of breath, states she is using her inhaler and nebulizer. She states she needs a refill on the Duo Neb solution.

## 2018-10-29 NOTE — DISCHARGE INSTRUCTIONS
Take the Zpak as prescribed for your bronchitis. Use your nebs every 4-6 hours for wheezing, tightness, shortness of breath or coughing spells. Take the prednisone as prescribed for your asthma. Increase fluids and rest. Take tylenol as directed for fevers/aches. Return here with any new or worsening symptoms.         What Is Acute Bronchitis?  Acute bronchitis is when the airways in your lungs (bronchial tubes) become red and swollen (inflamed). It is usually caused by a viral infection. But it can also occur because of a bacteria or allergen. Symptoms include a cough that produces yellow or greenish mucus and can last for days or sometimes weeks.  Inside healthy lungs    Air travels in and out of the lungs through the airways. The linings of these airways produce sticky mucus. This mucus traps particles that enter the lungs. Tiny structures called cilia then sweep the particles out of the airways.     Healthy airway: Airways are normally open. Air moves in and out easily.      Healthy cilia: Tiny, hairlike cilia sweep mucus and particles up and out of the airways.     Lungs with bronchitis  Bronchitis often occurs with a cold or the flu virus. The airways become inflamed (red and swollen). There is a deep hacking cough from the extra mucus. Other symptoms may include:    Wheezing    Chest discomfort    Shortness of breath    Mild fever  A second infection, this time due to bacteria, may then occur. And airways irritated by allergens or smoke are more likely to get infected.        Inflamed airway: Inflammation and extra mucus narrow the airway, causing shortness of breath.      Impaired cilia: Extra mucus impairs cilia, causing congestion and wheezing. Smoking makes the problem worse.     Making a diagnosis  A physical exam, health history, and certain tests help your healthcare provider make the diagnosis.  Health history  Your healthcare provider will ask you about your symptoms.  The exam  Your provider listens  to your chest for signs of congestion. He or she may also check your ears, nose, and throat.  Possible tests    A sputum test for bacteria. This requires a sample of mucus from your lungs.    A nasal or throat swab. This tests to see if you have a bacterial infection.    A chest X-ray. This is done if your healthcare provider thinks you have pneumonia.    Tests to check for an underlying condition. Other tests may be done to check for things such as allergies, asthma, or COPD (chronic obstructive pulmonary disease). You may need to see a specialist for more lung function testing.  Treating a cough  The main treatment for bronchitis is easing symptoms. Avoiding smoke, allergens, and other things that trigger coughing can often help. If the infection is bacterial, you may be given antibiotics. During the illness, it's important to get plenty of sleep. To ease symptoms:    Don t smoke. Also avoid secondhand smoke.    Use a humidifier. Or try breathing in steam from a hot shower. This may help loosen mucus.    Drink a lot of water and juice. They can soothe the throat and may help thin mucus.    Sit up or use extra pillows when in bed. This helps to lessen coughing and congestion.    Ask your provider about using medicine. Ask about using cough medicine, pain and fever medicine, or a decongestant.  Antibiotics  Most cases of bronchitis are caused by cold or flu viruses. They don t need antibiotics to treat them, even if your mucus is thick and green or yellow. Antibiotics don t treat viral illness and antibiotics have not been shown to have any benefit in cases of acute bronchitis. Taking antibiotics when they are not needed increases your risk of getting an infection later that is antibiotic-resistant. Antibiotics can also cause severe cases of diarrhea that require other antibiotics to treat.  It is important that you accept your healthcare provider's opinion to not use antibiotics. Your provider will prescribe  antibiotics if the infection is caused by bacteria. If they are prescribed:    Take all of the medicine. Take the medicine until it is used up, even if symptoms have improved. If you don t, the bronchitis may come back.    Take the medicines as directed. For instance, some medicines should be taken with food.    Ask about side effects. Ask your provider or pharmacist what side effects are common, and what to do about them.  Follow-up care  You should see your provider again in 2 to 3 weeks. By this time, symptoms should have improved. An infection that lasts longer may mean you have a more serious problem.  Prevention    Avoid tobacco smoke. If you smoke, quit. Stay away from smoky places. Ask friends and family not to smoke around you, or in your home or car.    Get checked for allergies.    Ask your provider about getting a yearly flu shot. Also ask about pneumococcal or pneumonia shots.    Wash your hands often. This helps reduce the chance of picking up viruses that cause colds and flu.  Call your healthcare provider if:    Symptoms worsen, or you have new symptoms    Breathing problems worsen or  become severe    Symptoms don t get better within a week, or within 3 days of taking antibiotics   Date Last Reviewed: 2/1/2017 2000-2017 [employer]. [address] [place] [address]. All rights reserved. This information is not intended as a substitute for professional medical care. Always follow your healthcare professional's instructions.          Asthma (Adult)  Asthma is a disease where the medium and  small air passages within the lung go into spasm and restrict the flow of air. Inflammation and swelling of the airways cause further blockage. During an acute asthma attack, these factors cause trouble breathing, wheezing, cough and chest tightness.    An asthma attack can be triggered by many things. Common triggers include infections such as the common cold, bronchitis, and pneumonia.  "Irritants such as smoke or pollutants in the air, very cold air, emotional upset, and exercise can also trigger an attack. In many adults with asthma, allergies to dust, mold, pollen and animal dander can cause an asthma attack. Skipping doses of daily asthma medicine can also bring on an asthma attack.  Asthma can be controlled using the proper medicines prescribed by your healthcare provider and avoiding exposure to known triggers including allergens and irritants.  Home care    Take prescribed medicine exactly at the times advised. If you need medicine such as from a hand held inhaler or aerosol breathing machine more than every 4 hours, contact your healthcare provider or seek immediate medical attention. If prescribed an antibiotic or prednisone, take all of the medicine as prescribed, even if you are feeling better after a few days.    Don't smoke. Avoid being exposed to the smoke of others.    Some people with asthma have worsening of their symptoms when they take aspirin and non-steroidal or fever-reducing medicines like ibuprofen and naproxen. Talk to your healthcare provider if you think this may apply to you.  Follow-up care  Follow up with your healthcare provider, or as advised. Always bring all of your current medicines to any appointments with your healthcare provider. Also bring a complete list of medicines even those not taken for asthma. If you don't already have one, talk to your healthcare provider about developing your own \"Asthma Action Plan.\"  A pneumococcal (pneumonia) vaccine and yearly flu shot (every fall) are recommended. Ask your doctor about this.  When to seek medical advice  Call your healthcare provider right away if any of these occur:     Increased wheezing or shortness of breath    Need to use your inhalers more often than usual without relief    Fever of 100.4 F (38 C) or higher, or as directed by your healthcare provider    Coughing up lots of dark-colored or bloody sputum " (mucus)    Chest pain with each breath    If you use a peak flow meter as part of an Asthma Action Plan, and you are still in the yellow zone (50% to 80%) 15 minutes after using inhaler medicine.  Call 911  Call 911 if any of the following occur    Trouble walking or talking because of shortness of breath    If you use a peak flow meter as part of an Asthma Action Plan and you are still in the red zone (less than 50%) 15 minutes after using inhaler medicine    Lips or fingernails turning gray or blue  Date Last Reviewed: 5/1/2017 2000-2017 [employer]. [address] [place] [address], [place] [address]. All rights reserved. This information is not intended as a substitute for professional medical care. Always follow your healthcare professional's instructions.

## 2018-10-29 NOTE — ED NOTES
The patient verbalized understanding of the discharge instructions and medications prescribed. Reports improvement in her breathing since arrival.

## 2018-10-29 NOTE — ED AVS SNAPSHOT
[facility]    [address] [address]    Phone:  [phone]                                       Cassandra Camejo   MRN: [id number]    Department:  [facility]   Date of Visit:  10/29/2018           Patient Information     Date Of Birth          1985        Your diagnoses for this visit were:     Acute bronchitis, unspecified organism     Exacerbation of asthma, unspecified asthma severity, unspecified whether persistent        You were seen by Itzel Ventura PA-C.      Follow-up Information     Follow up with Varinder Burk MD In 1 week.    Specialty:  Family Practice    Contact information:    [facility]  [address]  [facility] [address]  [phone]          Follow up with [facility].    Specialty:  EMERGENCY MEDICINE    Why:  If symptoms worsen    Contact information:    [address] [address] [phone]    Additional information:    From [facility]: Take [address]. Turn left at [address]. Turn left at the first stoplight on [address]. At the first stop sign, take a right onto [address]. Take a left into the parking lot and continue through until you reach the North enterance of the building.       From [place]: Take [address]. Take the [address] ramp towards [place]. Turn left onto [address]. Take a slight right onto [address]. Turn left at the first stoplight on [address]. At the first stop sign, take a right onto [address]. Take a left into the parking lot and continue through until you reach the North enterance of the building.       From [place]: Take [address]. Take a right at [address]. At the first stop sign, take a right onto [address]. Take a left into the parking lot and continue through until you reach the North enterance of the building.         Discharge Instructions       Take  the Zpak as prescribed for your bronchitis. Use your nebs every 4-6 hours for wheezing, tightness, shortness of breath or coughing spells. Take the prednisone as prescribed for your asthma. Increase fluids and rest. Take tylenol as directed for fevers/aches. Return here with any new or worsening symptoms.         What Is Acute Bronchitis?  Acute bronchitis is when the airways in your lungs (bronchial tubes) become red and swollen (inflamed). It is usually caused by a viral infection. But it can also occur because of a bacteria or allergen. Symptoms include a cough that produces yellow or greenish mucus and can last for days or sometimes weeks.  Inside healthy lungs    Air travels in and out of the lungs through the airways. The linings of these airways produce sticky mucus. This mucus traps particles that enter the lungs. Tiny structures called cilia then sweep the particles out of the airways.     Healthy airway: Airways are normally open. Air moves in and out easily.      Healthy cilia: Tiny, hairlike cilia sweep mucus and particles up and out of the airways.     Lungs with bronchitis  Bronchitis often occurs with a cold or the flu virus. The airways become inflamed (red and swollen). There is a deep hacking cough from the extra mucus. Other symptoms may include:    Wheezing    Chest discomfort    Shortness of breath    Mild fever  A second infection, this time due to bacteria, may then occur. And airways irritated by allergens or smoke are more likely to get infected.        Inflamed airway: Inflammation and extra mucus narrow the airway, causing shortness of breath.      Impaired cilia: Extra mucus impairs cilia, causing congestion and wheezing. Smoking makes the problem worse.     Making a diagnosis  A physical exam, health history, and certain tests help your healthcare provider make the diagnosis.  Health history  Your healthcare provider will ask you about your symptoms.  The exam  Your provider listens  to your chest for signs of congestion. He or she may also check your ears, nose, and throat.  Possible tests    A sputum test for bacteria. This requires a sample of mucus from your lungs.    A nasal or throat swab. This tests to see if you have a bacterial infection.    A chest X-ray. This is done if your healthcare provider thinks you have pneumonia.    Tests to check for an underlying condition. Other tests may be done to check for things such as allergies, asthma, or COPD (chronic obstructive pulmonary disease). You may need to see a specialist for more lung function testing.  Treating a cough  The main treatment for bronchitis is easing symptoms. Avoiding smoke, allergens, and other things that trigger coughing can often help. If the infection is bacterial, you may be given antibiotics. During the illness, it's important to get plenty of sleep. To ease symptoms:    Don t smoke. Also avoid secondhand smoke.    Use a humidifier. Or try breathing in steam from a hot shower. This may help loosen mucus.    Drink a lot of water and juice. They can soothe the throat and may help thin mucus.    Sit up or use extra pillows when in bed. This helps to lessen coughing and congestion.    Ask your provider about using medicine. Ask about using cough medicine, pain and fever medicine, or a decongestant.  Antibiotics  Most cases of bronchitis are caused by cold or flu viruses. They don t need antibiotics to treat them, even if your mucus is thick and green or yellow. Antibiotics don t treat viral illness and antibiotics have not been shown to have any benefit in cases of acute bronchitis. Taking antibiotics when they are not needed increases your risk of getting an infection later that is antibiotic-resistant. Antibiotics can also cause severe cases of diarrhea that require other antibiotics to treat.  It is important that you accept your healthcare provider's opinion to not use antibiotics. Your provider will prescribe  antibiotics if the infection is caused by bacteria. If they are prescribed:    Take all of the medicine. Take the medicine until it is used up, even if symptoms have improved. If you don t, the bronchitis may come back.    Take the medicines as directed. For instance, some medicines should be taken with food.    Ask about side effects. Ask your provider or pharmacist what side effects are common, and what to do about them.  Follow-up care  You should see your provider again in 2 to 3 weeks. By this time, symptoms should have improved. An infection that lasts longer may mean you have a more serious problem.  Prevention    Avoid tobacco smoke. If you smoke, quit. Stay away from smoky places. Ask friends and family not to smoke around you, or in your home or car.    Get checked for allergies.    Ask your provider about getting a yearly flu shot. Also ask about pneumococcal or pneumonia shots.    Wash your hands often. This helps reduce the chance of picking up viruses that cause colds and flu.  Call your healthcare provider if:    Symptoms worsen, or you have new symptoms    Breathing problems worsen or  become severe    Symptoms don t get better within a week, or within 3 days of taking antibiotics   Date Last Reviewed: 2/1/2017 2000-2017 [employer]. [address] [place] [address]. All rights reserved. This information is not intended as a substitute for professional medical care. Always follow your healthcare professional's instructions.          Asthma (Adult)  Asthma is a disease where the medium and  small air passages within the lung go into spasm and restrict the flow of air. Inflammation and swelling of the airways cause further blockage. During an acute asthma attack, these factors cause trouble breathing, wheezing, cough and chest tightness.    An asthma attack can be triggered by many things. Common triggers include infections such as the common cold, bronchitis, and pneumonia.  "Irritants such as smoke or pollutants in the air, very cold air, emotional upset, and exercise can also trigger an attack. In many adults with asthma, allergies to dust, mold, pollen and animal dander can cause an asthma attack. Skipping doses of daily asthma medicine can also bring on an asthma attack.  Asthma can be controlled using the proper medicines prescribed by your healthcare provider and avoiding exposure to known triggers including allergens and irritants.  Home care    Take prescribed medicine exactly at the times advised. If you need medicine such as from a hand held inhaler or aerosol breathing machine more than every 4 hours, contact your healthcare provider or seek immediate medical attention. If prescribed an antibiotic or prednisone, take all of the medicine as prescribed, even if you are feeling better after a few days.    Don't smoke. Avoid being exposed to the smoke of others.    Some people with asthma have worsening of their symptoms when they take aspirin and non-steroidal or fever-reducing medicines like ibuprofen and naproxen. Talk to your healthcare provider if you think this may apply to you.  Follow-up care  Follow up with your healthcare provider, or as advised. Always bring all of your current medicines to any appointments with your healthcare provider. Also bring a complete list of medicines even those not taken for asthma. If you don't already have one, talk to your healthcare provider about developing your own \"Asthma Action Plan.\"  A pneumococcal (pneumonia) vaccine and yearly flu shot (every fall) are recommended. Ask your doctor about this.  When to seek medical advice  Call your healthcare provider right away if any of these occur:     Increased wheezing or shortness of breath    Need to use your inhalers more often than usual without relief    Fever of 100.4 F (38 C) or higher, or as directed by your healthcare provider    Coughing up lots of dark-colored or bloody sputum " (mucus)    Chest pain with each breath    If you use a peak flow meter as part of an Asthma Action Plan, and you are still in the yellow zone (50% to 80%) 15 minutes after using inhaler medicine.  Call 911  Call 911 if any of the following occur    Trouble walking or talking because of shortness of breath    If you use a peak flow meter as part of an Asthma Action Plan and you are still in the red zone (less than 50%) 15 minutes after using inhaler medicine    Lips or fingernails turning gray or blue  Date Last Reviewed: 5/1/2017 2000-2017 [employer]. [address] [place] [address]. All rights reserved. This information is not intended as a substitute for professional medical care. Always follow your healthcare professional's instructions.             Review of your medicines      START taking        Dose / Directions Last dose taken    azithromycin 250 MG tablet   Commonly known as:  ZITHROMAX Z-VANDA   Quantity:  6 tablet        Two tablets on the first day, then one tablet daily for the next 4 days   Refills:  0        predniSONE 20 MG tablet   Commonly known as:  DELTASONE   Quantity:  10 tablet        Take two tablets (= 40mg) each day for 5 (five) days   Refills:  0          CONTINUE these medicines which may have CHANGED, or have new prescriptions. If we are uncertain of the size of tablets/capsules you have at home, strength may be listed as something that might have changed.        Dose / Directions Last dose taken    ipratropium - albuterol 0.5 mg/2.5 mg/3 mL 0.5-2.5 (3) MG/3ML neb solution   Commonly known as:  DUONEB   Dose:  1 vial   What changed:    - when to take this  - reasons to take this   Quantity:  360 mL        Take 1 vial (3 mLs) by nebulization every 6 hours as needed for shortness of breath / dyspnea or wheezing   Refills:  3          Our records show that you are taking the medicines listed below. If these are incorrect, please call your family doctor or clinic.         Dose / Directions Last dose taken    albuterol 108 (90 Base) MCG/ACT inhaler   Commonly known as:  PROAIR HFA/PROVENTIL HFA/VENTOLIN HFA   Dose:  2 puff        Inhale 2 puffs into the lungs every 6 hours as needed   Refills:  0        HYDROcodone-acetaminophen 5-325 MG per tablet   Commonly known as:  NORCO   Dose:  1 tablet        Take 1 tablet by mouth every 6 hours as needed for severe pain   Refills:  0        hydrOXYzine 25 MG tablet   Commonly known as:  ATARAX   Dose:  25 mg   Quantity:  30 tablet        Take 1 tablet (25 mg) by mouth every 6 hours as needed for itching (nausea, muscle spasms, and adjuvant pain.)   Refills:  0        IBUPROFEN PO   Dose:  400 mg        Take 400 mg by mouth every 6 hours as needed for moderate pain   Refills:  0        order for DME   Quantity:  1 Device        Equipment being ordered: Other: nebulization machine Treatment Diagnosis: acute asthma exacerbation   Refills:  0                Information about OPIOIDS     PRESCRIPTION OPIOIDS: WHAT YOU NEED TO KNOW   We gave you an opioid (narcotic) pain medicine. It is important to manage your pain, but opioids are not always the best choice. You should first try all the other options your care team gave you. Take this medicine for as short a time (and as few doses) as possible.    Some activities can increase your pain, such as bandage changes or therapy sessions. It may help to take your pain medicine 30 to 60 minutes before these activities. Reduce your stress by getting enough sleep, working on hobbies you enjoy and practicing relaxation or meditation. Talk to your care team about ways to manage your pain beyond prescription opioids.    These medicines have risks:    DO NOT drive when on new or higher doses of pain medicine. These medicines can affect your alertness and reaction times, and you could be arrested for driving under the influence (DUI). If you need to use opioids long-term, talk to your care team about  driving.    DO NOT operate heavy machinery    DO NOT do any other dangerous activities while taking these medicines.    DO NOT drink any alcohol while taking these medicines.     If the opioid prescribed includes acetaminophen, DO NOT take with any other medicines that contain acetaminophen. Read all labels carefully. Look for the word  acetaminophen  or  Tylenol.  Ask your pharmacist if you have questions or are unsure.    You can get addicted to pain medicines, especially if you have a history of addiction (chemical, alcohol or substance dependence). Talk to your care team about ways to reduce this risk.    All opioids tend to cause constipation. Drink plenty of water and eat foods that have a lot of fiber, such as fruits, vegetables, prune juice, apple juice and high-fiber cereal. Take a laxative (Miralax, milk of magnesia, Colace, Senna) if you don t move your bowels at least every other day. Other side effects include upset stomach, sleepiness, dizziness, throwing up, tolerance (needing more of the medicine to have the same effect), physical dependence and slowed breathing.    Store your pills in a secure place, locked if possible. We will not replace any lost or stolen medicine. If you don t finish your medicine, please throw away (dispose) as directed by your pharmacist. The [place] [employer]ution [employer] has more information about safe disposal: https://www.pca.state.[place]/living-green/managing-unwanted-medications        Prescriptions were sent or printed at these locations (3 Prescriptions)                   [facility] [address] AT [address] [address]    Telephone:  [phone]   Fax:  [phone]   Hours:                  E-Prescribed (3 of 3)         azithromycin (ZITHROMAX Z-VANDA) 250 MG tablet               ipratropium - albuterol 0.5 mg/2.5 mg/3 mL (DUONEB) 0.5-2.5 (3) MG/3ML neb solution                "predniSONE (DELTASONE) 20 MG tablet                Procedures and tests performed during your visit     Chest XR,  PA & LAT      Orders Needing Specimen Collection     None      Pending Results     No orders found from 10/27/2018 to 10/30/2018.            Pending Culture Results     No orders found from 10/27/2018 to 10/30/2018.            Thank you for choosing [facility]       Thank you for choosing [facility] for your care. Our goal is always to provide you with excellent care. Hearing back from our patients is one way we can continue to improve our services. Please take a few minutes to complete the written survey that you may receive in the mail after you visit with us. Thank you!        [employer] [facility] [employer] Information     [employer] lets you send messages to your doctor, view your test results, renew your prescriptions, schedule appointments and more. To sign up, go to [email or web address] . Click on \"Log in\" on the left side of the screen, which will take you to the Welcome page. Then click on \"Sign up Now\" on the right side of the page.     You will be asked to enter the access code listed below, as well as some personal information. Please follow the directions to create your username and password.     Your access code is: [email or web address]  Expires: 2019  2:14 PM     Your access code will  in 90 days. If you need help or a new code, please call your [facility] clinic or [phone].        Care EveryWhere ID     This is your Care EveryWhere ID. This could be used by other organizations to access your [facility] medical records  [id number]        Equal Access to Services     [facility] DIANE : Hadii yvonne reid hadasho Sojaxonali, waaxda luqadaha, qaybta kaalmada adeanaiyada, selwyn fitzgerald. So [facility] [phone].    ATENCIÓN: Si habla español, tiene a begum disposición servicios gratuitos de asistencia lingüística. Llame al [phone].    We comply with applicable federal civil rights laws and [place] " laws. We do not discriminate on the basis of race, color, national origin, age, disability, sex, sexual orientation, or gender identity.            After Visit Summary       This is your record. Keep this with you and show to your community pharmacist(s) and doctor(s) at your next visit.

## 2018-10-29 NOTE — LETTER
October 29, 2018      To Whom It May Concern:      Cassandra Camejo was seen in our Emergency Department today, 10/29/18.  No work today or tomorrow.    Sincerely,        Itzel Ventura PA-C
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
## 2018-10-29 NOTE — ED AVS SNAPSHOT
[facility]    [address] [place] [id number]    Phone:  [phone]                                       Cassandra Camejo   MRN: [id number]    Department:  [facility]   Date of Visit:  10/29/2018           After Visit Summary Signature Page     I have received my discharge instructions, and my questions have been answered. I have discussed any challenges I see with this plan with the nurse or doctor.    ..........................................................................................................................................  Patient/Patient Representative Signature      ..........................................................................................................................................  Patient Representative Print Name and Relationship to Patient    ..................................................               ................................................  Date                                   Time    ..........................................................................................................................................  Reviewed by Signature/Title    ...................................................              ..............................................  Date                                               Time          22EPIC Rev 08/18

## 2021-03-09 ENCOUNTER — HOSPITAL ENCOUNTER (OUTPATIENT)
Dept: ULTRASOUND IMAGING | Facility: HOSPITAL | Age: 36
Discharge: HOME | End: 2021-03-09
Attending: FAMILY MEDICINE | Admitting: FAMILY MEDICINE
Payer: MEDICARE

## 2021-03-09 ENCOUNTER — MEDICAL CORRESPONDENCE (OUTPATIENT)
Dept: ULTRASOUND IMAGING | Facility: HOSPITAL | Age: 36
End: 2021-03-09

## 2021-03-09 DIAGNOSIS — R10.2 PELVIC PAIN: ICD-10-CM

## 2021-03-09 PROCEDURE — 76830 TRANSVAGINAL US NON-OB: CPT

## 2021-09-23 NOTE — ANESTHESIA POSTPROCEDURE EVALUATION
Patient: Cassandra Camejo    Procedure(s):  REVISION RIGHT TALONAVICULAR FUSION WITH HARDWARE REMOVAL  - Wound Class: I-Clean   - Wound Class: I-Clean    Diagnosis:RIGHT TALONAVICULAR NON UNION AND FAILED HARDWARE  Diagnosis Additional Information: No value filed.    Anesthesia Type:  General, LMA, Periph. Nerve Block for postop pain    Note:  Anesthesia Post Evaluation    Patient location during evaluation: PACU  Patient participation: Able to fully participate in evaluation  Level of consciousness: awake  Pain management: adequate  Airway patency: patent  Cardiovascular status: acceptable  Respiratory status: acceptable  Hydration status: acceptable  PONV: controlled     Anesthetic complications: None          Last vitals:  Vitals:    05/03/18 0539 05/03/18 0715 05/03/18 0930   BP: 123/58 132/73    Resp: 24 16    Temp: 36.4  C (97.5  F)  36.4  C (97.5  F)   SpO2: 97% 97% 97%         Electronically Signed By: Arjun Corbett MD  May 3, 2018  9:39 AM  
Patient/Caregiver provided printed discharge information.

## 2023-09-30 ENCOUNTER — HOSPITAL ENCOUNTER (EMERGENCY)
Facility: HOSPITAL | Age: 38
Discharge: HOME | End: 2023-09-30
Attending: STUDENT IN AN ORGANIZED HEALTH CARE EDUCATION/TRAINING PROGRAM | Admitting: STUDENT IN AN ORGANIZED HEALTH CARE EDUCATION/TRAINING PROGRAM
Payer: MEDICARE

## 2023-09-30 VITALS
SYSTOLIC BLOOD PRESSURE: 143 MMHG | DIASTOLIC BLOOD PRESSURE: 90 MMHG | OXYGEN SATURATION: 97 % | RESPIRATION RATE: 18 BRPM | HEART RATE: 93 BPM | TEMPERATURE: 97.6 F

## 2023-09-30 DIAGNOSIS — H66.002 NON-RECURRENT ACUTE SUPPURATIVE OTITIS MEDIA OF LEFT EAR WITHOUT SPONTANEOUS RUPTURE OF TYMPANIC MEMBRANE: ICD-10-CM

## 2023-09-30 PROCEDURE — 99282 EMERGENCY DEPT VISIT SF MDM: CPT | Performed by: STUDENT IN AN ORGANIZED HEALTH CARE EDUCATION/TRAINING PROGRAM

## 2023-09-30 PROCEDURE — 99283 EMERGENCY DEPT VISIT LOW MDM: CPT
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
## 2023-09-30 NOTE — DISCHARGE INSTRUCTIONS
Return to the emergency department for worsening symptoms or new concerning symptoms.  Take the antibiotics as directed.  Please consider using probiotics while you are on antibiotics.  Follow-up with your primary care provider at your scheduled appointment this coming Wednesday.  Continue to treat pain and fevers with ibuprofen and Tylenol

## 2025-04-03 NOTE — PLAN OF CARE
Problem: Patient Goal: Rt Focus  Goal: 1. Patient Goal: RT Focus  Outcome: No Change  Patient given MDI as ordered.  Breath sounds clear.  No neb treatments needed.  95% on room air.       Skin normal color for race, warm, dry and intact. No evidence of rash.

## 2025-06-05 ENCOUNTER — APPOINTMENT (OUTPATIENT)
Dept: ULTRASOUND IMAGING | Facility: HOSPITAL | Age: 40
End: 2025-06-05
Attending: NURSE PRACTITIONER
Payer: COMMERCIAL

## 2025-06-05 ENCOUNTER — APPOINTMENT (OUTPATIENT)
Dept: CT IMAGING | Facility: HOSPITAL | Age: 40
End: 2025-06-05
Attending: NURSE PRACTITIONER
Payer: COMMERCIAL

## 2025-06-05 ENCOUNTER — HOSPITAL ENCOUNTER (EMERGENCY)
Facility: HOSPITAL | Age: 40
End: 2025-06-05
Attending: INTERNAL MEDICINE
Payer: MEDICARE

## 2025-06-05 VITALS
HEART RATE: 99 BPM | DIASTOLIC BLOOD PRESSURE: 77 MMHG | SYSTOLIC BLOOD PRESSURE: 152 MMHG | OXYGEN SATURATION: 98 % | TEMPERATURE: 98.2 F | RESPIRATION RATE: 20 BRPM | WEIGHT: 193.1 LBS | BODY MASS INDEX: 32.13 KG/M2

## 2025-06-05 DIAGNOSIS — N20.0 KIDNEY STONE: ICD-10-CM

## 2025-06-05 LAB
ALBUMIN SERPL BCG-MCNC: 4.2 G/DL (ref 3.5–5.2)
ALBUMIN UR-MCNC: 50 MG/DL
ALP SERPL-CCNC: 51 U/L (ref 40–150)
ALT SERPL W P-5'-P-CCNC: 8 U/L (ref 0–50)
ANION GAP SERPL CALCULATED.3IONS-SCNC: 15 MMOL/L (ref 7–15)
APPEARANCE UR: ABNORMAL
AST SERPL W P-5'-P-CCNC: 14 U/L (ref 0–45)
BACTERIA #/AREA URNS HPF: ABNORMAL /HPF
BASOPHILS # BLD AUTO: 0.1 10E3/UL (ref 0–0.2)
BASOPHILS NFR BLD AUTO: 1 %
BILIRUB DIRECT SERPL-MCNC: 0.1 MG/DL (ref 0–0.3)
BILIRUB SERPL-MCNC: 0.2 MG/DL
BILIRUB UR QL STRIP: NEGATIVE
BUN SERPL-MCNC: 14.9 MG/DL (ref 6–20)
CALCIUM SERPL-MCNC: 9.5 MG/DL (ref 8.8–10.4)
CAOX CRY #/AREA URNS HPF: ABNORMAL /HPF
CHLORIDE SERPL-SCNC: 104 MMOL/L (ref 98–107)
COLOR UR AUTO: YELLOW
CREAT SERPL-MCNC: 0.65 MG/DL (ref 0.51–0.95)
EGFRCR SERPLBLD CKD-EPI 2021: >90 ML/MIN/1.73M2
EOSINOPHIL # BLD AUTO: 0.2 10E3/UL (ref 0–0.7)
EOSINOPHIL NFR BLD AUTO: 2 %
ERYTHROCYTE [DISTWIDTH] IN BLOOD BY AUTOMATED COUNT: 18.4 % (ref 10–15)
GLUCOSE SERPL-MCNC: 124 MG/DL (ref 70–99)
GLUCOSE UR STRIP-MCNC: NEGATIVE MG/DL
HCG UR QL: NEGATIVE
HCO3 SERPL-SCNC: 17 MMOL/L (ref 22–29)
HCT VFR BLD AUTO: 30.7 % (ref 35–47)
HGB BLD-MCNC: 9.4 G/DL (ref 11.7–15.7)
HGB UR QL STRIP: ABNORMAL
HOLD SPECIMEN: NORMAL
IMM GRANULOCYTES # BLD: 0.1 10E3/UL
IMM GRANULOCYTES NFR BLD: 1 %
KETONES UR STRIP-MCNC: ABNORMAL MG/DL
LEUKOCYTE ESTERASE UR QL STRIP: ABNORMAL
LIPASE SERPL-CCNC: 45 U/L (ref 13–60)
LYMPHOCYTES # BLD AUTO: 1.6 10E3/UL (ref 0.8–5.3)
LYMPHOCYTES NFR BLD AUTO: 12 %
MCH RBC QN AUTO: 22.2 PG (ref 26.5–33)
MCHC RBC AUTO-ENTMCNC: 30.6 G/DL (ref 31.5–36.5)
MCV RBC AUTO: 72 FL (ref 78–100)
MONOCYTES # BLD AUTO: 1.1 10E3/UL (ref 0–1.3)
MONOCYTES NFR BLD AUTO: 8 %
MUCOUS THREADS #/AREA URNS LPF: PRESENT /LPF
NEUTROPHILS # BLD AUTO: 10.1 10E3/UL (ref 1.6–8.3)
NEUTROPHILS NFR BLD AUTO: 77 %
NITRATE UR QL: NEGATIVE
NRBC # BLD AUTO: 0 10E3/UL
NRBC BLD AUTO-RTO: 0 /100
PH UR STRIP: 5.5 [PH] (ref 4.7–8)
PLATELET # BLD AUTO: 414 10E3/UL (ref 150–450)
POTASSIUM SERPL-SCNC: 3.9 MMOL/L (ref 3.4–5.3)
PROT SERPL-MCNC: 7.9 G/DL (ref 6.4–8.3)
RBC # BLD AUTO: 4.24 10E6/UL (ref 3.8–5.2)
RBC URINE: 97 /HPF
SODIUM SERPL-SCNC: 136 MMOL/L (ref 135–145)
SP GR UR STRIP: 1.03 (ref 1–1.03)
SQUAMOUS EPITHELIAL: 13 /HPF
UROBILINOGEN UR STRIP-MCNC: NORMAL MG/DL
WBC # BLD AUTO: 13.1 10E3/UL (ref 4–11)
WBC URINE: 24 /HPF

## 2025-06-05 PROCEDURE — 81003 URINALYSIS AUTO W/O SCOPE: CPT | Performed by: NURSE PRACTITIONER

## 2025-06-05 PROCEDURE — 250N000011 HC RX IP 250 OP 636: Performed by: NURSE PRACTITIONER

## 2025-06-05 PROCEDURE — 76705 ECHO EXAM OF ABDOMEN: CPT | Mod: TC

## 2025-06-05 PROCEDURE — 36415 COLL VENOUS BLD VENIPUNCTURE: CPT | Performed by: INTERNAL MEDICINE

## 2025-06-05 PROCEDURE — 250N000011 HC RX IP 250 OP 636: Mod: JZ | Performed by: NURSE PRACTITIONER

## 2025-06-05 PROCEDURE — 74177 CT ABD & PELVIS W/CONTRAST: CPT

## 2025-06-05 PROCEDURE — 81001 URINALYSIS AUTO W/SCOPE: CPT | Performed by: INTERNAL MEDICINE

## 2025-06-05 PROCEDURE — 74177 CT ABD & PELVIS W/CONTRAST: CPT | Mod: 26 | Performed by: RADIOLOGY

## 2025-06-05 PROCEDURE — 80048 BASIC METABOLIC PNL TOTAL CA: CPT | Performed by: INTERNAL MEDICINE

## 2025-06-05 PROCEDURE — 85048 AUTOMATED LEUKOCYTE COUNT: CPT | Performed by: INTERNAL MEDICINE

## 2025-06-05 PROCEDURE — 83690 ASSAY OF LIPASE: CPT | Performed by: NURSE PRACTITIONER

## 2025-06-05 PROCEDURE — 96374 THER/PROPH/DIAG INJ IV PUSH: CPT | Mod: XU

## 2025-06-05 PROCEDURE — 82248 BILIRUBIN DIRECT: CPT | Performed by: NURSE PRACTITIONER

## 2025-06-05 PROCEDURE — 84450 TRANSFERASE (AST) (SGOT): CPT | Performed by: NURSE PRACTITIONER

## 2025-06-05 PROCEDURE — 82374 ASSAY BLOOD CARBON DIOXIDE: CPT | Performed by: INTERNAL MEDICINE

## 2025-06-05 PROCEDURE — 81025 URINE PREGNANCY TEST: CPT | Performed by: NURSE PRACTITIONER

## 2025-06-05 PROCEDURE — 76705 ECHO EXAM OF ABDOMEN: CPT | Mod: 26 | Performed by: NURSE PRACTITIONER

## 2025-06-05 PROCEDURE — 99284 EMERGENCY DEPT VISIT MOD MDM: CPT | Mod: 25 | Performed by: INTERNAL MEDICINE

## 2025-06-05 PROCEDURE — 99285 EMERGENCY DEPT VISIT HI MDM: CPT | Mod: 25

## 2025-06-05 PROCEDURE — 85004 AUTOMATED DIFF WBC COUNT: CPT | Performed by: INTERNAL MEDICINE

## 2025-06-05 RX ORDER — KETOROLAC TROMETHAMINE 15 MG/ML
15 INJECTION, SOLUTION INTRAMUSCULAR; INTRAVENOUS ONCE
Status: COMPLETED | OUTPATIENT
Start: 2025-06-05 | End: 2025-06-05

## 2025-06-05 RX ORDER — IOPAMIDOL 755 MG/ML
96 INJECTION, SOLUTION INTRAVASCULAR ONCE
Status: COMPLETED | OUTPATIENT
Start: 2025-06-05 | End: 2025-06-05

## 2025-06-05 RX ORDER — OXYCODONE HYDROCHLORIDE 5 MG/1
5 TABLET ORAL EVERY 6 HOURS PRN
Qty: 10 TABLET | Refills: 0 | Status: SHIPPED | OUTPATIENT
Start: 2025-06-05

## 2025-06-05 RX ORDER — TAMSULOSIN HYDROCHLORIDE 0.4 MG/1
0.4 CAPSULE ORAL DAILY
Qty: 7 CAPSULE | Refills: 0 | Status: SHIPPED | OUTPATIENT
Start: 2025-06-05

## 2025-06-05 RX ORDER — CIPROFLOXACIN 500 MG/1
500 TABLET, FILM COATED ORAL 2 TIMES DAILY
Qty: 14 TABLET | Refills: 0 | Status: SHIPPED | OUTPATIENT
Start: 2025-06-05 | End: 2025-06-10

## 2025-06-05 RX ORDER — KETOROLAC TROMETHAMINE 15 MG/ML
15 INJECTION, SOLUTION INTRAMUSCULAR; INTRAVENOUS ONCE
Status: DISCONTINUED | OUTPATIENT
Start: 2025-06-05 | End: 2025-06-05

## 2025-06-05 RX ADMIN — IOPAMIDOL 96 ML: 755 INJECTION, SOLUTION INTRAVENOUS at 22:39

## 2025-06-05 RX ADMIN — KETOROLAC TROMETHAMINE 15 MG: 15 INJECTION, SOLUTION INTRAMUSCULAR; INTRAVENOUS at 23:00

## 2025-06-05 ASSESSMENT — ENCOUNTER SYMPTOMS
CARDIOVASCULAR NEGATIVE: 1
ABDOMINAL PAIN: 1
HEMATOLOGIC/LYMPHATIC NEGATIVE: 1
ENDOCRINE NEGATIVE: 1
FLANK PAIN: 1
CONSTIPATION: 0
DYSURIA: 0
ALLERGIC/IMMUNOLOGIC NEGATIVE: 1
CONSTITUTIONAL NEGATIVE: 1
BLOOD IN STOOL: 0
EYES NEGATIVE: 1
DIARRHEA: 0
HEMATURIA: 0
NAUSEA: 0
PSYCHIATRIC NEGATIVE: 1
VOMITING: 0
NEUROLOGICAL NEGATIVE: 1
RESPIRATORY NEGATIVE: 1

## 2025-06-05 ASSESSMENT — COLUMBIA-SUICIDE SEVERITY RATING SCALE - C-SSRS
1. IN THE PAST MONTH, HAVE YOU WISHED YOU WERE DEAD OR WISHED YOU COULD GO TO SLEEP AND NOT WAKE UP?: NO
2. HAVE YOU ACTUALLY HAD ANY THOUGHTS OF KILLING YOURSELF IN THE PAST MONTH?: NO
6. HAVE YOU EVER DONE ANYTHING, STARTED TO DO ANYTHING, OR PREPARED TO DO ANYTHING TO END YOUR LIFE?: NO

## 2025-06-05 ASSESSMENT — ACTIVITIES OF DAILY LIVING (ADL)
ADLS_ACUITY_SCORE: 41
ADLS_ACUITY_SCORE: 41

## 2025-06-06 VITALS
DIASTOLIC BLOOD PRESSURE: 86 MMHG | RESPIRATION RATE: 20 BRPM | TEMPERATURE: 98.2 F | BODY MASS INDEX: 32.13 KG/M2 | HEART RATE: 99 BPM | OXYGEN SATURATION: 95 % | WEIGHT: 193.1 LBS | SYSTOLIC BLOOD PRESSURE: 127 MMHG

## 2025-06-06 NOTE — ED NOTES
Patient to room 11, settled on cot with call light with in reach.    States that she feels better than she did when she came in.     Patient states she took 3 Ibuprofen and 3 Tylenol on her way here.
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
## 2025-06-06 NOTE — ED TRIAGE NOTES
States that about 2 hours ago started to get right flank pain. Denies pain or bleeding with urinating. Has not urinated in a couple hours except dribbling. States felt better to sit on toilet.      Triage Assessment (Adult)       Row Name 06/05/25 4056          Triage Assessment    Airway WDL WDL        Respiratory WDL    Respiratory WDL WDL        Skin Circulation/Temperature WDL    Skin Circulation/Temperature WDL WDL        Cardiac WDL    Cardiac WDL WDL        Peripheral/Neurovascular WDL    Peripheral Neurovascular WDL WDL        Cognitive/Neuro/Behavioral WDL    Cognitive/Neuro/Behavioral WDL WDL

## 2025-06-06 NOTE — ED NOTES
AVS reviewed, all questions answered.   Return to ED if needed &/or follow-up w/ urology.   Instymed code provided.

## 2025-06-09 ENCOUNTER — OFFICE VISIT (OUTPATIENT)
Dept: UROLOGY | Facility: OTHER | Age: 40
End: 2025-06-09
Attending: UROLOGY
Payer: COMMERCIAL

## 2025-06-09 VITALS — OXYGEN SATURATION: 98 % | HEART RATE: 95 BPM | SYSTOLIC BLOOD PRESSURE: 128 MMHG | DIASTOLIC BLOOD PRESSURE: 80 MMHG

## 2025-06-09 DIAGNOSIS — N20.0 KIDNEY STONE: ICD-10-CM

## 2025-06-09 DIAGNOSIS — N20.1 RIGHT URETERAL STONE: Primary | ICD-10-CM

## 2025-06-09 PROCEDURE — 99204 OFFICE O/P NEW MOD 45 MIN: CPT | Performed by: UROLOGY

## 2025-06-09 PROCEDURE — 3079F DIAST BP 80-89 MM HG: CPT | Performed by: UROLOGY

## 2025-06-09 PROCEDURE — 1125F AMNT PAIN NOTED PAIN PRSNT: CPT | Performed by: UROLOGY

## 2025-06-09 PROCEDURE — 3074F SYST BP LT 130 MM HG: CPT | Performed by: UROLOGY

## 2025-06-09 RX ORDER — TAMSULOSIN HYDROCHLORIDE 0.4 MG/1
0.4 CAPSULE ORAL DAILY
Qty: 10 CAPSULE | Refills: 0 | Status: SHIPPED | OUTPATIENT
Start: 2025-06-09

## 2025-06-09 ASSESSMENT — PAIN SCALES - GENERAL: PAINLEVEL_OUTOF10: MODERATE PAIN (4)
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
## (undated) DEVICE — CAST PADDING 6" STERILE 9046S

## (undated) DEVICE — SOL NACL 0.9% IRRIG 1000ML BOTTLE 07138-09

## (undated) DEVICE — PACK EXTREMITY SOP15EXFSD

## (undated) DEVICE — GLOVE PROTEXIS POWDER FREE 8.0 ORTHOPEDIC 2D73ET80

## (undated) DEVICE — SU VICRYL 2-0 CT-1 27" UND J259H

## (undated) DEVICE — CAST PADDING 4" UNSTERILE 9044

## (undated) DEVICE — DRAPE IOBAN INCISE 23X17" 6650EZ

## (undated) DEVICE — TUBING ARTHRO CONMED/LINVATEC PUMP BLUE INFLOW 10K100

## (undated) DEVICE — SU VICRYL 2-0 UR-6 27" J602H

## (undated) DEVICE — GLOVE PROTEXIS W/NEU-THERA 7.5  2D73TE75

## (undated) DEVICE — PERFORATOR BONE FENESTRATION P99-100-2009

## (undated) DEVICE — DRAPE MINI C-ARM 4003

## (undated) DEVICE — Device

## (undated) DEVICE — SU MONOCRYL 3-0 PS-2 27" Y427H

## (undated) DEVICE — DRSG GAUZE 4X4" 3033

## (undated) DEVICE — GOWN IMPERVIOUS SPECIALTY XLG/XLONG 32474

## (undated) DEVICE — BLADE SHAVER ARTHRO 4.2MM CUDA C9254

## (undated) DEVICE — PACK SET-UP STD 9102

## (undated) DEVICE — LINEN TOWEL PACK X5 5464

## (undated) DEVICE — SUCTION CANISTER MEDIVAC LINER 3000ML W/LID 65651-530

## (undated) DEVICE — DRSG XEROFORM 1X8"

## (undated) DEVICE — IMM LIMB ELEVATOR DC40-0203

## (undated) DEVICE — SYR BULB IRRIG DOVER 60 ML LATEX FREE 67000

## (undated) DEVICE — GLOVE PROTEXIS BLUE W/NEU-THERA 8.0  2D73EB80

## (undated) DEVICE — SOL NACL 0.9% IRRIG 3000ML BAG 2B7477

## (undated) DEVICE — SU MONOCRYL 4-0 PS-2 18" UND Y496G

## (undated) DEVICE — GLOVE ORTHO 8 LATEX

## (undated) DEVICE — PREP CHLORAPREP 26ML TINTED ORANGE  260815

## (undated) DEVICE — SU ETHILON 3-0 PS-1 18" 1663G

## (undated) DEVICE — SOL WATER IRRIG 1000ML BOTTLE 2F7114

## (undated) DEVICE — BLADE KNIFE SURG 15 371115

## (undated) DEVICE — BNDG ELASTIC 4" DBL LENGTH UNSTERILE 6611-14

## (undated) DEVICE — CAST PADDING 4" STERILE 9044S

## (undated) DEVICE — DRILL AO PARAGON 28 2.0X110MM P99-100-2011

## (undated) DEVICE — SU ETHILON 4-0 FS-2 18" 662H

## (undated) DEVICE — CAST PLASTER SPLINT 5X30" 7395

## (undated) DEVICE — SU ETHILON 3-0 FS-1 18" 663G

## (undated) RX ORDER — FENTANYL CITRATE 50 UG/ML
INJECTION, SOLUTION INTRAMUSCULAR; INTRAVENOUS
Status: DISPENSED
Start: 2018-05-03

## (undated) RX ORDER — PROPOFOL 10 MG/ML
INJECTION, EMULSION INTRAVENOUS
Status: DISPENSED
Start: 2018-05-03

## (undated) RX ORDER — FENTANYL CITRATE 50 UG/ML
INJECTION, SOLUTION INTRAMUSCULAR; INTRAVENOUS
Status: DISPENSED
Start: 2017-11-13

## (undated) RX ORDER — ONDANSETRON 2 MG/ML
INJECTION INTRAMUSCULAR; INTRAVENOUS
Status: DISPENSED
Start: 2017-11-13

## (undated) RX ORDER — PROPOFOL 10 MG/ML
INJECTION, EMULSION INTRAVENOUS
Status: DISPENSED
Start: 2017-11-13

## (undated) RX ORDER — DEXAMETHASONE SODIUM PHOSPHATE 4 MG/ML
INJECTION, SOLUTION INTRA-ARTICULAR; INTRALESIONAL; INTRAMUSCULAR; INTRAVENOUS; SOFT TISSUE
Status: DISPENSED
Start: 2018-05-03

## (undated) RX ORDER — HYDROMORPHONE HYDROCHLORIDE 1 MG/ML
INJECTION, SOLUTION INTRAMUSCULAR; INTRAVENOUS; SUBCUTANEOUS
Status: DISPENSED
Start: 2018-05-03

## (undated) RX ORDER — ALBUTEROL SULFATE 90 UG/1
AEROSOL, METERED RESPIRATORY (INHALATION)
Status: DISPENSED
Start: 2017-11-13

## (undated) RX ORDER — HYDROXYZINE HYDROCHLORIDE 25 MG/1
TABLET, FILM COATED ORAL
Status: DISPENSED
Start: 2017-11-13

## (undated) RX ORDER — LIDOCAINE HYDROCHLORIDE 20 MG/ML
INJECTION, SOLUTION EPIDURAL; INFILTRATION; INTRACAUDAL; PERINEURAL
Status: DISPENSED
Start: 2018-05-03

## (undated) RX ORDER — DEXAMETHASONE SODIUM PHOSPHATE 4 MG/ML
INJECTION, SOLUTION INTRA-ARTICULAR; INTRALESIONAL; INTRAMUSCULAR; INTRAVENOUS; SOFT TISSUE
Status: DISPENSED
Start: 2017-11-13

## (undated) RX ORDER — ONDANSETRON 2 MG/ML
INJECTION INTRAMUSCULAR; INTRAVENOUS
Status: DISPENSED
Start: 2018-05-03

## (undated) RX ORDER — OXYCODONE HYDROCHLORIDE 5 MG/1
TABLET ORAL
Status: DISPENSED
Start: 2017-11-13

## (undated) RX ORDER — CEFAZOLIN SODIUM 1 G/50ML
SOLUTION INTRAVENOUS
Status: DISPENSED
Start: 2017-11-13

## (undated) RX ORDER — HYDROMORPHONE HYDROCHLORIDE 1 MG/ML
INJECTION, SOLUTION INTRAMUSCULAR; INTRAVENOUS; SUBCUTANEOUS
Status: DISPENSED
Start: 2017-11-13

## (undated) RX ORDER — CEFAZOLIN SODIUM 1 G/3ML
INJECTION, POWDER, FOR SOLUTION INTRAMUSCULAR; INTRAVENOUS
Status: DISPENSED
Start: 2017-11-13

## (undated) RX ORDER — CEFAZOLIN SODIUM 1 G/50ML
SOLUTION INTRAVENOUS
Status: DISPENSED
Start: 2018-05-03

## (undated) RX ORDER — HYDROXYZINE HYDROCHLORIDE 25 MG/1
TABLET, FILM COATED ORAL
Status: DISPENSED
Start: 2018-05-03

## (undated) RX ORDER — ALBUTEROL SULFATE 0.83 MG/ML
SOLUTION RESPIRATORY (INHALATION)
Status: DISPENSED
Start: 2018-05-03

## (undated) RX ORDER — OXYCODONE HYDROCHLORIDE 5 MG/1
TABLET ORAL
Status: DISPENSED
Start: 2018-05-03